# Patient Record
Sex: MALE | Race: WHITE | NOT HISPANIC OR LATINO
[De-identification: names, ages, dates, MRNs, and addresses within clinical notes are randomized per-mention and may not be internally consistent; named-entity substitution may affect disease eponyms.]

---

## 2017-02-02 ENCOUNTER — APPOINTMENT (OUTPATIENT)
Dept: OPHTHALMOLOGY | Facility: CLINIC | Age: 57
End: 2017-02-02

## 2017-02-02 DIAGNOSIS — Z86.39 PERSONAL HISTORY OF OTHER ENDOCRINE, NUTRITIONAL AND METABOLIC DISEASE: ICD-10-CM

## 2017-02-02 DIAGNOSIS — Z86.79 PERSONAL HISTORY OF OTHER DISEASES OF THE CIRCULATORY SYSTEM: ICD-10-CM

## 2017-02-02 RX ORDER — METFORMIN HYDROCHLORIDE 500 MG/1
500 TABLET, COATED ORAL
Refills: 0 | Status: ACTIVE | COMMUNITY

## 2017-02-02 RX ORDER — HYDROCHLOROTHIAZIDE 12.5 MG/1
12.5 TABLET ORAL
Refills: 0 | Status: ACTIVE | COMMUNITY

## 2017-02-02 RX ORDER — AMLODIPINE BESYLATE 10 MG/1
10 TABLET ORAL
Refills: 0 | Status: ACTIVE | COMMUNITY
Start: 2017-02-02

## 2017-02-02 RX ORDER — FLUPHENAZINE HCL 5 MG
5 TABLET ORAL
Refills: 0 | Status: ACTIVE | COMMUNITY

## 2017-02-02 RX ORDER — CLONAZEPAM 1 MG/1
1 TABLET ORAL
Refills: 0 | Status: ACTIVE | COMMUNITY

## 2017-02-02 RX ORDER — ATENOLOL 100 MG/1
100 TABLET ORAL
Refills: 0 | Status: ACTIVE | COMMUNITY

## 2017-02-02 RX ORDER — FENOFIBRATE 145 MG/1
2 TABLET ORAL
Refills: 0 | Status: ACTIVE | COMMUNITY

## 2017-08-23 ENCOUNTER — APPOINTMENT (OUTPATIENT)
Dept: OPHTHALMOLOGY | Facility: CLINIC | Age: 57
End: 2017-08-23
Payer: MEDICARE

## 2017-08-23 PROCEDURE — 92014 COMPRE OPH EXAM EST PT 1/>: CPT

## 2018-08-08 ENCOUNTER — APPOINTMENT (OUTPATIENT)
Dept: OPHTHALMOLOGY | Facility: CLINIC | Age: 58
End: 2018-08-08
Payer: MEDICARE

## 2018-08-08 DIAGNOSIS — H35.373 PUCKERING OF MACULA, BILATERAL: ICD-10-CM

## 2018-08-08 DIAGNOSIS — H26.9 UNSPECIFIED CATARACT: ICD-10-CM

## 2018-08-08 DIAGNOSIS — H25.13 AGE-RELATED NUCLEAR CATARACT, BILATERAL: ICD-10-CM

## 2018-08-08 DIAGNOSIS — H02.89 OTHER SPECIFIED DISORDERS OF EYELID: ICD-10-CM

## 2018-08-08 PROCEDURE — 92134 CPTRZ OPH DX IMG PST SGM RTA: CPT

## 2018-08-08 PROCEDURE — 92014 COMPRE OPH EXAM EST PT 1/>: CPT

## 2019-08-09 ENCOUNTER — NON-APPOINTMENT (OUTPATIENT)
Age: 59
End: 2019-08-09

## 2019-08-09 ENCOUNTER — APPOINTMENT (OUTPATIENT)
Dept: OPHTHALMOLOGY | Facility: CLINIC | Age: 59
End: 2019-08-09
Payer: MEDICARE

## 2019-08-09 PROCEDURE — 92014 COMPRE OPH EXAM EST PT 1/>: CPT

## 2020-06-04 ENCOUNTER — NON-APPOINTMENT (OUTPATIENT)
Age: 60
End: 2020-06-04

## 2020-06-04 ENCOUNTER — APPOINTMENT (OUTPATIENT)
Dept: OPHTHALMOLOGY | Facility: CLINIC | Age: 60
End: 2020-06-04
Payer: MEDICARE

## 2020-06-04 PROCEDURE — 92014 COMPRE OPH EXAM EST PT 1/>: CPT

## 2020-12-21 ENCOUNTER — APPOINTMENT (OUTPATIENT)
Dept: OPHTHALMOLOGY | Facility: CLINIC | Age: 60
End: 2020-12-21
Payer: MEDICARE

## 2020-12-21 ENCOUNTER — NON-APPOINTMENT (OUTPATIENT)
Age: 60
End: 2020-12-21

## 2020-12-21 PROCEDURE — 92012 INTRM OPH EXAM EST PATIENT: CPT

## 2021-06-21 ENCOUNTER — NON-APPOINTMENT (OUTPATIENT)
Age: 61
End: 2021-06-21

## 2021-06-21 ENCOUNTER — APPOINTMENT (OUTPATIENT)
Dept: OPHTHALMOLOGY | Facility: CLINIC | Age: 61
End: 2021-06-21
Payer: MEDICARE

## 2021-06-21 PROCEDURE — 92014 COMPRE OPH EXAM EST PT 1/>: CPT

## 2021-09-15 ENCOUNTER — APPOINTMENT (OUTPATIENT)
Dept: UROLOGY | Facility: CLINIC | Age: 61
End: 2021-09-15

## 2022-08-06 ENCOUNTER — INPATIENT (INPATIENT)
Facility: HOSPITAL | Age: 62
LOS: 3 days | Discharge: NURSING, REHAB OR LONG-TERM CARE | DRG: 393 | End: 2022-08-10
Attending: INTERNAL MEDICINE | Admitting: FAMILY MEDICINE
Payer: MEDICARE

## 2022-08-06 VITALS
RESPIRATION RATE: 18 BRPM | DIASTOLIC BLOOD PRESSURE: 71 MMHG | OXYGEN SATURATION: 99 % | WEIGHT: 154.98 LBS | SYSTOLIC BLOOD PRESSURE: 100 MMHG | TEMPERATURE: 97 F | HEIGHT: 74 IN | HEART RATE: 117 BPM

## 2022-08-06 DIAGNOSIS — Z93.2 ILEOSTOMY STATUS: Chronic | ICD-10-CM

## 2022-08-06 LAB
ALBUMIN SERPL ELPH-MCNC: 1.3 G/DL — LOW (ref 3.3–5)
ALP SERPL-CCNC: 78 U/L — SIGNIFICANT CHANGE UP (ref 40–120)
ALT FLD-CCNC: 69 U/L — HIGH (ref 10–45)
ANION GAP SERPL CALC-SCNC: 10 MMOL/L — SIGNIFICANT CHANGE UP (ref 5–17)
APTT BLD: 18.6 SEC — LOW (ref 27.5–35.5)
AST SERPL-CCNC: 47 U/L — HIGH (ref 10–40)
BASOPHILS # BLD AUTO: 0.08 K/UL — SIGNIFICANT CHANGE UP (ref 0–0.2)
BASOPHILS NFR BLD AUTO: 0.4 % — SIGNIFICANT CHANGE UP (ref 0–2)
BILIRUB SERPL-MCNC: 0.2 MG/DL — SIGNIFICANT CHANGE UP (ref 0.2–1.2)
BUN SERPL-MCNC: 61 MG/DL — HIGH (ref 7–23)
CALCIUM SERPL-MCNC: 7 MG/DL — LOW (ref 8.4–10.5)
CHLORIDE SERPL-SCNC: 98 MMOL/L — SIGNIFICANT CHANGE UP (ref 96–108)
CO2 SERPL-SCNC: 18 MMOL/L — LOW (ref 22–31)
CREAT SERPL-MCNC: 1.32 MG/DL — HIGH (ref 0.5–1.3)
EGFR: 61 ML/MIN/1.73M2 — SIGNIFICANT CHANGE UP
EOSINOPHIL # BLD AUTO: 0 K/UL — SIGNIFICANT CHANGE UP (ref 0–0.5)
EOSINOPHIL NFR BLD AUTO: 0 % — SIGNIFICANT CHANGE UP (ref 0–6)
GLUCOSE SERPL-MCNC: 169 MG/DL — HIGH (ref 70–99)
HCT VFR BLD CALC: 41 % — SIGNIFICANT CHANGE UP (ref 39–50)
HGB BLD-MCNC: 13.7 G/DL — SIGNIFICANT CHANGE UP (ref 13–17)
IMM GRANULOCYTES NFR BLD AUTO: 1.2 % — SIGNIFICANT CHANGE UP (ref 0–1.5)
INR BLD: 0.97 RATIO — SIGNIFICANT CHANGE UP (ref 0.88–1.16)
LACTATE SERPL-SCNC: 5.1 MMOL/L — CRITICAL HIGH (ref 0.7–2)
LIDOCAIN IGE QN: 88 U/L — SIGNIFICANT CHANGE UP (ref 73–393)
LYMPHOCYTES # BLD AUTO: 0.51 K/UL — LOW (ref 1–3.3)
LYMPHOCYTES # BLD AUTO: 2.8 % — LOW (ref 13–44)
MCHC RBC-ENTMCNC: 29.5 PG — SIGNIFICANT CHANGE UP (ref 27–34)
MCHC RBC-ENTMCNC: 33.4 GM/DL — SIGNIFICANT CHANGE UP (ref 32–36)
MCV RBC AUTO: 88.2 FL — SIGNIFICANT CHANGE UP (ref 80–100)
MONOCYTES # BLD AUTO: 2.05 K/UL — HIGH (ref 0–0.9)
MONOCYTES NFR BLD AUTO: 11.3 % — SIGNIFICANT CHANGE UP (ref 2–14)
NEUTROPHILS # BLD AUTO: 15.24 K/UL — HIGH (ref 1.8–7.4)
NEUTROPHILS NFR BLD AUTO: 84.3 % — HIGH (ref 43–77)
NRBC # BLD: 0 /100 WBCS — SIGNIFICANT CHANGE UP (ref 0–0)
PLATELET # BLD AUTO: 512 K/UL — HIGH (ref 150–400)
POTASSIUM SERPL-MCNC: 6.1 MMOL/L — HIGH (ref 3.5–5.3)
POTASSIUM SERPL-SCNC: 6.1 MMOL/L — HIGH (ref 3.5–5.3)
PROT SERPL-MCNC: 4.1 G/DL — LOW (ref 6–8.3)
PROTHROM AB SERPL-ACNC: 11.2 SEC — SIGNIFICANT CHANGE UP (ref 10.5–13.4)
RBC # BLD: 4.65 M/UL — SIGNIFICANT CHANGE UP (ref 4.2–5.8)
RBC # FLD: 16.3 % — HIGH (ref 10.3–14.5)
SARS-COV-2 RNA SPEC QL NAA+PROBE: SIGNIFICANT CHANGE UP
SODIUM SERPL-SCNC: 126 MMOL/L — LOW (ref 135–145)
WBC # BLD: 18.1 K/UL — HIGH (ref 3.8–10.5)
WBC # FLD AUTO: 18.1 K/UL — HIGH (ref 3.8–10.5)

## 2022-08-06 PROCEDURE — 71045 X-RAY EXAM CHEST 1 VIEW: CPT | Mod: 26,76

## 2022-08-06 PROCEDURE — 43753 TX GASTRO INTUB W/ASP: CPT | Mod: 59

## 2022-08-06 PROCEDURE — 93010 ELECTROCARDIOGRAM REPORT: CPT

## 2022-08-06 PROCEDURE — 74177 CT ABD & PELVIS W/CONTRAST: CPT | Mod: 26,MA

## 2022-08-06 PROCEDURE — 99291 CRITICAL CARE FIRST HOUR: CPT | Mod: FT,25

## 2022-08-06 RX ORDER — CALCIUM GLUCONATE 100 MG/ML
1 VIAL (ML) INTRAVENOUS ONCE
Refills: 0 | Status: COMPLETED | OUTPATIENT
Start: 2022-08-06 | End: 2022-08-06

## 2022-08-06 RX ORDER — INSULIN HUMAN 100 [IU]/ML
10 INJECTION, SOLUTION SUBCUTANEOUS ONCE
Refills: 0 | Status: COMPLETED | OUTPATIENT
Start: 2022-08-06 | End: 2022-08-06

## 2022-08-06 RX ORDER — MORPHINE SULFATE 50 MG/1
4 CAPSULE, EXTENDED RELEASE ORAL ONCE
Refills: 0 | Status: DISCONTINUED | OUTPATIENT
Start: 2022-08-06 | End: 2022-08-06

## 2022-08-06 RX ORDER — VANCOMYCIN HCL 1 G
1000 VIAL (EA) INTRAVENOUS ONCE
Refills: 0 | Status: COMPLETED | OUTPATIENT
Start: 2022-08-06 | End: 2022-08-06

## 2022-08-06 RX ORDER — PIPERACILLIN AND TAZOBACTAM 4; .5 G/20ML; G/20ML
3.38 INJECTION, POWDER, LYOPHILIZED, FOR SOLUTION INTRAVENOUS ONCE
Refills: 0 | Status: COMPLETED | OUTPATIENT
Start: 2022-08-06 | End: 2022-08-06

## 2022-08-06 RX ORDER — ONDANSETRON 8 MG/1
4 TABLET, FILM COATED ORAL ONCE
Refills: 0 | Status: COMPLETED | OUTPATIENT
Start: 2022-08-06 | End: 2022-08-06

## 2022-08-06 RX ORDER — SODIUM CHLORIDE 9 MG/ML
1000 INJECTION INTRAMUSCULAR; INTRAVENOUS; SUBCUTANEOUS ONCE
Refills: 0 | Status: COMPLETED | OUTPATIENT
Start: 2022-08-06 | End: 2022-08-06

## 2022-08-06 RX ORDER — FUROSEMIDE 40 MG
40 TABLET ORAL ONCE
Refills: 0 | Status: COMPLETED | OUTPATIENT
Start: 2022-08-06 | End: 2022-08-06

## 2022-08-06 RX ORDER — DEXTROSE 50 % IN WATER 50 %
50 SYRINGE (ML) INTRAVENOUS ONCE
Refills: 0 | Status: COMPLETED | OUTPATIENT
Start: 2022-08-06 | End: 2022-08-06

## 2022-08-06 RX ADMIN — PIPERACILLIN AND TAZOBACTAM 200 GRAM(S): 4; .5 INJECTION, POWDER, LYOPHILIZED, FOR SOLUTION INTRAVENOUS at 21:38

## 2022-08-06 RX ADMIN — Medication 250 MILLIGRAM(S): at 22:05

## 2022-08-06 RX ADMIN — PIPERACILLIN AND TAZOBACTAM 3.38 GRAM(S): 4; .5 INJECTION, POWDER, LYOPHILIZED, FOR SOLUTION INTRAVENOUS at 22:03

## 2022-08-06 RX ADMIN — Medication 1000 MILLIGRAM(S): at 23:18

## 2022-08-06 RX ADMIN — MORPHINE SULFATE 4 MILLIGRAM(S): 50 CAPSULE, EXTENDED RELEASE ORAL at 19:53

## 2022-08-06 RX ADMIN — SODIUM CHLORIDE 1000 MILLILITER(S): 9 INJECTION INTRAMUSCULAR; INTRAVENOUS; SUBCUTANEOUS at 20:38

## 2022-08-06 RX ADMIN — SODIUM CHLORIDE 1000 MILLILITER(S): 9 INJECTION INTRAMUSCULAR; INTRAVENOUS; SUBCUTANEOUS at 19:38

## 2022-08-06 RX ADMIN — MORPHINE SULFATE 4 MILLIGRAM(S): 50 CAPSULE, EXTENDED RELEASE ORAL at 19:38

## 2022-08-06 RX ADMIN — ONDANSETRON 4 MILLIGRAM(S): 8 TABLET, FILM COATED ORAL at 19:38

## 2022-08-06 NOTE — CONSULT NOTE ADULT - ASSESSMENT
Impression/Plan:  CNITHYA GUZMAN is a 63yo man with .     --------------------------------------------------------    HPI:  CINTHYA GUZMAN is a 63yo man who presented with .     Medical Hx:    Surgical Hx:    Medications:  atorvastatin 20 mg oral tablet: 1 tab(s) orally once a day  benztropine 2 mg oral tablet:   busPIRone 5 mg oral tablet: 1 tab(s) orally 2 times a day  calcitriol 0.25 mcg oral capsule: 1 cap(s) orally once a day  digoxin 125 mcg (0.125 mg) oral tablet: 1 tab(s) orally once a day  Eliquis 2.5 mg oral tablet: 1 tab(s) orally 2 times a day  fludrocortisone 0.1 mg oral tablet:   fluPHENAZine hydrochloride:   Levoxyl 50 mcg (0.05 mg) oral tablet: 1 tab(s) orally once a day  midodrine 5 mg oral tablet:   Pancreaze:   predniSONE 20 mg oral tablet: 1 tab(s) orally once a day    Allergies:  No Known Allergies    Social Hx:  No tobacco use, excessive ETOH use, or illicit drug use.     Family Hx:    ROS:  Except as noted in the HPI or above, all other systems negative.     Objective:   T(C): 36.9, Max: 36.9 (08-06-22 @ 20:30)  HR: 115 (105 - 117)  BP: 94/67 (84/49 - 107/66)  RR: 20 (18 - 20)  SpO2: 100% (99% - 100%)    Physical Exam  Constitutional: No distress  Eyes: EOMI; PERRL; no drainage or redness  ENMT: No gross abnormalities  Neck: No nodules.   Breasts: Not examined.  Back: No deformities.  Respiratory: Breath Sounds equal & clear to percussion & auscultation, no accessory muscle use.  Cardiovascular: RRR.  Gastrointestinal: Soft, non-distended, non-tender. No signs of diffuse peritonitis.   Genitourinary: Not examined.  Rectal: Not examined.  Extremities: No cyanosis, clubbing or edema.  Vascular: Palpable distal pulses.   Neurological: Alert and oriented. No focal deficits.   Skin: No pertinent lesions or rashes.   Lymph Nodes: No lymphadedenopathy  Musculoskeletal: No joint pain, swelling or deformity; no limitation of movement  Psychiatric: Normal affect, verbalizations, behavior.     Laboratory Data    Imaging Data Impression/Plan:  CINTHYA GUZMAN is a 63yo man with SBO caused by an incarcerated right inguinal hernia.    #SBO 2/to RIGHT INGUINAL HERNIA  - Hernia reduced at bedside.   - Serial abdominal examinations.   - Continue NGT and await for ileostomy function.   - Recommended to patient that he consider inguinal hernia repair prior to hospital discharge.   - Would appreciate medical optimization in anticipation of surgery.      --------------------------------------------------------    HPI:  CINTHYA GUZMAN is a 63yo man with a history pertinent for right colectomy and ileostomy formation who presented with abdominal pain and lack of ileostomy output. He does report having a right inguinal hernia since around October or November. The bulge typically appears when sitting in his wheelchair and self-reduces. Over the past day, however, he noted increasing pain in the right abdomen with increasing abdominal distension. Numerous episodes of vomiting. Workup was concerning for SBO and an irreducible right inguinal hernia. NGT was placed with feculent output. No fevers, chills, SOB, chest pain, or other concerning symptoms.     Medical Hx:  Schizophrenia  HTN  Afib  Chronic hyponatremia  Hypotension on midodrine.   Pancreatic insufficiency  HLD  DM2     Surgical Hx:  Right colon resection and ileostomy (for "water passing through" per patient. Records unavailable.   Lap cholecystectomy    Home Medications:  atorvastatin 20 mg oral tablet: 1 tab(s) orally once a day  benztropine 2 mg oral tablet:   busPIRone 5 mg oral tablet: 1 tab(s) orally 2 times a day  calcitriol 0.25 mcg oral capsule: 1 cap(s) orally once a day  digoxin 125 mcg (0.125 mg) oral tablet: 1 tab(s) orally once a day  Eliquis 2.5 mg oral tablet: 1 tab(s) orally 2 times a day  fludrocortisone 0.1 mg oral tablet:   fluPHENAZine hydrochloride:   Levoxyl 50 mcg (0.05 mg) oral tablet: 1 tab(s) orally once a day  midodrine 5 mg oral tablet:   Pancreaze:   predniSONE 20 mg oral tablet: 1 tab(s) orally once a day    Allergies:  No Known Allergies    Social Hx:  No tobacco use, excessive ETOH use, or illicit drug use.     Family Hx:    ROS:  Except as noted in the HPI or above, all other systems negative.     Objective:   T(C): 36.9, Max: 36.9 (08-06-22 @ 20:30)  HR: 115 (105 - 117)  BP: 94/67 (84/49 - 107/66)  RR: 20 (18 - 20)  SpO2: 100% (99% - 100%)    Physical Exam  Constitutional: No distress  Eyes: EOMI; PERRL; no drainage or redness  ENMT: NGT with feculent output.   Neck: No nodules.   Breasts: Not examined.  Back: No deformities.  Respiratory: Breath Sounds equal & clear to percussion & auscultation, no accessory muscle use.  Cardiovascular: RRR.  Gastrointestinal: Distended with tympany.   Genitourinary: Right inguinal hernia incarcerated and reduced. No overlying skin erythema or ecchymosis. No left inguinal hernia.   Rectal: Not examined.  Extremities: No cyanosis, clubbing or edema.  Vascular: Warm, well-perfused.   Neurological: Alert and oriented. No focal deficits.   Skin: No pertinent lesions or rashes.   Lymph Nodes: No lymphadedenopathy  Musculoskeletal: No joint pain, swelling or deformity; no limitation of movement  Psychiatric: Normal affect, verbalizations, behavior.     Laboratory Data            x                   126   | 98   | 61     x     )------( x         --------------------<169                x                   6.1   | 18   | 1.32     TP    x    | ALB 1.3       CA 7.0           PT x        ----------------------  AST 47   | ALT 69        MAG x          PTT x                ----------------------  TBIL 0.2  | DBIL x         PHOS x         INR x       ----------------------  ALP 78       Imaging Data  CT ABD/PEL 8/6/22:  SBO with transition at right inguinal hernia.  Impression/Plan:  CINTHYA GUZMAN is a 61yo man with SBO caused by an incarcerated right inguinal hernia.    #SBO 2/to RIGHT INGUINAL HERNIA  - Hernia reduced at bedside.   - Serial abdominal examinations.   - Continue NGT and await for ileostomy function.   - Recommended to patient that he consider inguinal hernia repair prior to hospital discharge.   - Would appreciate medical optimization in anticipation of surgery.      --------------------------------------------------------    HPI:  CINTHYA GUZMAN is a 61yo man with a history pertinent for total colectomy and ileostomy formation who presented with abdominal pain and lack of ileostomy output. He does report having a right inguinal hernia since around October or November. The bulge typically appears when sitting in his wheelchair and self-reduces. Over the past day, however, he noted increasing pain in the right abdomen with increasing abdominal distension. Numerous episodes of vomiting. Workup was concerning for SBO and an irreducible right inguinal hernia. NGT was placed with feculent output. No fevers, chills, SOB, chest pain, or other concerning symptoms.     Medical Hx:  Schizophrenia  HTN  Afib  Chronic hyponatremia  Hypotension on midodrine.   Pancreatic insufficiency  HLD  DM2     Surgical Hx:  Total colectomy and ileostomy (possibly for IBD? records unavailable)  Lap cholecystectomy    Home Medications:  atorvastatin 20 mg oral tablet: 1 tab(s) orally once a day  benztropine 2 mg oral tablet:   busPIRone 5 mg oral tablet: 1 tab(s) orally 2 times a day  calcitriol 0.25 mcg oral capsule: 1 cap(s) orally once a day  digoxin 125 mcg (0.125 mg) oral tablet: 1 tab(s) orally once a day  Eliquis 2.5 mg oral tablet: 1 tab(s) orally 2 times a day  fludrocortisone 0.1 mg oral tablet:   fluPHENAZine hydrochloride:   Levoxyl 50 mcg (0.05 mg) oral tablet: 1 tab(s) orally once a day  midodrine 5 mg oral tablet:   Pancreaze:   predniSONE 20 mg oral tablet: 1 tab(s) orally once a day    Allergies:  No Known Allergies    Social Hx:  No tobacco use, excessive ETOH use, or illicit drug use.     Family Hx:    ROS:  Except as noted in the HPI or above, all other systems negative.     Objective:   T(C): 36.9, Max: 36.9 (08-06-22 @ 20:30)  HR: 115 (105 - 117)  BP: 94/67 (84/49 - 107/66)  RR: 20 (18 - 20)  SpO2: 100% (99% - 100%)    Physical Exam  Constitutional: No distress  Eyes: EOMI; PERRL; no drainage or redness  ENMT: NGT with feculent output.   Neck: No nodules.   Breasts: Not examined.  Back: No deformities.  Respiratory: Breath Sounds equal & clear to percussion & auscultation, no accessory muscle use.  Cardiovascular: RRR.  Gastrointestinal: Distended with tympany.   Genitourinary: Right inguinal hernia incarcerated and reduced. No overlying skin erythema or ecchymosis. No left inguinal hernia.   Rectal: Not examined.  Extremities: No cyanosis, clubbing or edema.  Vascular: Warm, well-perfused.   Neurological: Alert and oriented. No focal deficits.   Skin: No pertinent lesions or rashes.   Lymph Nodes: No lymphadedenopathy  Musculoskeletal: No joint pain, swelling or deformity; no limitation of movement  Psychiatric: Normal affect, verbalizations, behavior.     Laboratory Data            x                   126   | 98   | 61     x     )------( x         --------------------<169                x                   6.1   | 18   | 1.32     TP    x    | ALB 1.3       CA 7.0           PT x        ----------------------  AST 47   | ALT 69        MAG x          PTT x                ----------------------  TBIL 0.2  | DBIL x         PHOS x         INR x       ----------------------  ALP 78       Imaging Data  CT ABD/PEL 8/6/22:  SBO with transition at right inguinal hernia.  Impression/Plan:  CINTHYA GUZMAN is a 61yo man with SBO caused by an incarcerated right inguinal hernia.    #SBO 2/to RIGHT INGUINAL HERNIA  - Hernia reduced at bedside.   - Serial abdominal examinations.   - Continue NGT and await for ileostomy function.   - Recommended to patient that he consider inguinal hernia repair in the near future.   - May need to wait until excoriations on right groin from ileostomy output irritation heals.   - Would appreciate medical optimization in anticipation of surgery.      --------------------------------------------------------    HPI:  CINTHYA GUZMAN is a 61yo man with a history pertinent for total colectomy and ileostomy formation who presented with abdominal pain and lack of ileostomy output. He does report having a right inguinal hernia since around October or November. The bulge typically appears when sitting in his wheelchair and self-reduces. Over the past day, however, he noted increasing pain in the right abdomen with increasing abdominal distension. Numerous episodes of vomiting. Workup was concerning for SBO and an irreducible right inguinal hernia. NGT was placed with feculent output. No fevers, chills, SOB, chest pain, or other concerning symptoms.     Medical Hx:  Schizophrenia  HTN  Afib  Chronic hyponatremia  Hypotension on midodrine.   Pancreatic insufficiency  HLD  DM2     Surgical Hx:  Total colectomy and ileostomy (possibly for IBD? records unavailable)  Lap cholecystectomy    Home Medications:  atorvastatin 20 mg oral tablet: 1 tab(s) orally once a day  benztropine 2 mg oral tablet:   busPIRone 5 mg oral tablet: 1 tab(s) orally 2 times a day  calcitriol 0.25 mcg oral capsule: 1 cap(s) orally once a day  digoxin 125 mcg (0.125 mg) oral tablet: 1 tab(s) orally once a day  Eliquis 2.5 mg oral tablet: 1 tab(s) orally 2 times a day  fludrocortisone 0.1 mg oral tablet:   fluPHENAZine hydrochloride:   Levoxyl 50 mcg (0.05 mg) oral tablet: 1 tab(s) orally once a day  midodrine 5 mg oral tablet:   Pancreaze:   predniSONE 20 mg oral tablet: 1 tab(s) orally once a day    Allergies:  No Known Allergies    Social Hx:  No tobacco use, excessive ETOH use, or illicit drug use.     Family Hx:    ROS:  Except as noted in the HPI or above, all other systems negative.     Objective:   T(C): 36.9, Max: 36.9 (08-06-22 @ 20:30)  HR: 115 (105 - 117)  BP: 94/67 (84/49 - 107/66)  RR: 20 (18 - 20)  SpO2: 100% (99% - 100%)    Physical Exam  Constitutional: No distress  Eyes: EOMI; PERRL; no drainage or redness  ENMT: NGT with feculent output.   Neck: No nodules.   Breasts: Not examined.  Back: No deformities.  Respiratory: Breath Sounds equal & clear to percussion & auscultation, no accessory muscle use.  Cardiovascular: RRR.  Gastrointestinal: Distended with tympany.   Genitourinary: Right inguinal hernia incarcerated and reduced. No overlying skin erythema or ecchymosis. Excoriations from ileostomy output. No left inguinal hernia.   Rectal: Not examined.  Extremities: No cyanosis, clubbing or edema.  Vascular: Warm, well-perfused.   Neurological: Alert and oriented. No focal deficits.   Skin: No pertinent lesions or rashes.   Lymph Nodes: No lymphadedenopathy  Musculoskeletal: No joint pain, swelling or deformity; no limitation of movement  Psychiatric: Normal affect, verbalizations, behavior.     Laboratory Data            x                   126   | 98   | 61     x     )------( x         --------------------<169                x                   6.1   | 18   | 1.32     TP    x    | ALB 1.3       CA 7.0           PT x        ----------------------  AST 47   | ALT 69        MAG x          PTT x                ----------------------  TBIL 0.2  | DBIL x         PHOS x         INR x       ----------------------  ALP 78       Imaging Data  CT ABD/PEL 8/6/22:  SBO with transition at right inguinal hernia.

## 2022-08-06 NOTE — ED ADULT TRIAGE NOTE - CHIEF COMPLAINT QUOTE
BIB EMS from [facility] for c/o abd pain with n/v since yesterday. Per EMS, staff report they think there is an issue with his ileostomy has had minimal drainage since yesterday,

## 2022-08-06 NOTE — ED ADULT NURSE NOTE - NSICDXPASTMEDICALHX_GEN_ALL_CORE_FT
PAST MEDICAL HISTORY:  Atrial fibrillation     Generalized anxiety disorder     Heart failure     History of hypotension     Type 2 diabetes mellitus

## 2022-08-07 DIAGNOSIS — K56.609 UNSPECIFIED INTESTINAL OBSTRUCTION, UNSPECIFIED AS TO PARTIAL VERSUS COMPLETE OBSTRUCTION: ICD-10-CM

## 2022-08-07 DIAGNOSIS — Z90.49 ACQUIRED ABSENCE OF OTHER SPECIFIED PARTS OF DIGESTIVE TRACT: Chronic | ICD-10-CM

## 2022-08-07 LAB
ANION GAP SERPL CALC-SCNC: 10 MMOL/L — SIGNIFICANT CHANGE UP (ref 5–17)
APPEARANCE UR: CLEAR — SIGNIFICANT CHANGE UP
BILIRUB UR-MCNC: NEGATIVE — SIGNIFICANT CHANGE UP
BUN SERPL-MCNC: 63 MG/DL — HIGH (ref 7–23)
CALCIUM SERPL-MCNC: 7 MG/DL — LOW (ref 8.4–10.5)
CHLORIDE SERPL-SCNC: 100 MMOL/L — SIGNIFICANT CHANGE UP (ref 96–108)
CO2 SERPL-SCNC: 17 MMOL/L — LOW (ref 22–31)
COLOR SPEC: YELLOW — SIGNIFICANT CHANGE UP
CREAT SERPL-MCNC: 1.45 MG/DL — HIGH (ref 0.5–1.3)
DIFF PNL FLD: NEGATIVE — SIGNIFICANT CHANGE UP
EGFR: 54 ML/MIN/1.73M2 — LOW
GLUCOSE BLDC GLUCOMTR-MCNC: 151 MG/DL — HIGH (ref 70–99)
GLUCOSE SERPL-MCNC: 129 MG/DL — HIGH (ref 70–99)
GLUCOSE UR QL: NEGATIVE — SIGNIFICANT CHANGE UP
KETONES UR-MCNC: NEGATIVE — SIGNIFICANT CHANGE UP
LACTATE SERPL-SCNC: 2.4 MMOL/L — HIGH (ref 0.7–2)
LACTATE SERPL-SCNC: 4.2 MMOL/L — CRITICAL HIGH (ref 0.7–2)
LEUKOCYTE ESTERASE UR-ACNC: NEGATIVE — SIGNIFICANT CHANGE UP
NITRITE UR-MCNC: NEGATIVE — SIGNIFICANT CHANGE UP
PH UR: 5 — SIGNIFICANT CHANGE UP (ref 5–8)
PHOSPHATE SERPL-MCNC: 5.7 MG/DL — HIGH (ref 2.5–4.5)
POTASSIUM SERPL-MCNC: 5.5 MMOL/L — HIGH (ref 3.5–5.3)
POTASSIUM SERPL-SCNC: 5.5 MMOL/L — HIGH (ref 3.5–5.3)
PROT UR-MCNC: NEGATIVE — SIGNIFICANT CHANGE UP
SODIUM SERPL-SCNC: 127 MMOL/L — LOW (ref 135–145)
SP GR SPEC: 1.01 — SIGNIFICANT CHANGE UP (ref 1.01–1.02)
UROBILINOGEN FLD QL: NEGATIVE — SIGNIFICANT CHANGE UP

## 2022-08-07 PROCEDURE — 99497 ADVNCD CARE PLAN 30 MIN: CPT | Mod: 25

## 2022-08-07 PROCEDURE — 99291 CRITICAL CARE FIRST HOUR: CPT

## 2022-08-07 PROCEDURE — 99292 CRITICAL CARE ADDL 30 MIN: CPT

## 2022-08-07 PROCEDURE — 99231 SBSQ HOSP IP/OBS SF/LOW 25: CPT

## 2022-08-07 PROCEDURE — 93010 ELECTROCARDIOGRAM REPORT: CPT

## 2022-08-07 RX ORDER — HYDROCORTISONE 20 MG
500 TABLET ORAL ONCE
Refills: 0 | Status: COMPLETED | OUTPATIENT
Start: 2022-08-07 | End: 2022-08-07

## 2022-08-07 RX ORDER — SODIUM CHLORIDE 9 MG/ML
1000 INJECTION, SOLUTION INTRAVENOUS
Refills: 0 | Status: DISCONTINUED | OUTPATIENT
Start: 2022-08-07 | End: 2022-08-07

## 2022-08-07 RX ORDER — LANOLIN ALCOHOL/MO/W.PET/CERES
3 CREAM (GRAM) TOPICAL AT BEDTIME
Refills: 0 | Status: DISCONTINUED | OUTPATIENT
Start: 2022-08-07 | End: 2022-08-10

## 2022-08-07 RX ORDER — SODIUM CHLORIDE 9 MG/ML
1000 INJECTION, SOLUTION INTRAVENOUS
Refills: 0 | Status: DISCONTINUED | OUTPATIENT
Start: 2022-08-07 | End: 2022-08-08

## 2022-08-07 RX ORDER — ALBUMIN HUMAN 25 %
100 VIAL (ML) INTRAVENOUS
Refills: 0 | Status: COMPLETED | OUTPATIENT
Start: 2022-08-07 | End: 2022-08-07

## 2022-08-07 RX ORDER — LIPASE/PROTEASE/AMYLASE 16-48-48K
2 CAPSULE,DELAYED RELEASE (ENTERIC COATED) ORAL
Refills: 0 | Status: DISCONTINUED | OUTPATIENT
Start: 2022-08-07 | End: 2022-08-10

## 2022-08-07 RX ORDER — ACETAMINOPHEN 500 MG
650 TABLET ORAL EVERY 6 HOURS
Refills: 0 | Status: DISCONTINUED | OUTPATIENT
Start: 2022-08-07 | End: 2022-08-10

## 2022-08-07 RX ORDER — BENZTROPINE MESYLATE 1 MG
2 TABLET ORAL DAILY
Refills: 0 | Status: DISCONTINUED | OUTPATIENT
Start: 2022-08-07 | End: 2022-08-08

## 2022-08-07 RX ORDER — LEVOTHYROXINE SODIUM 125 MCG
25 TABLET ORAL AT BEDTIME
Refills: 0 | Status: DISCONTINUED | OUTPATIENT
Start: 2022-08-07 | End: 2022-08-08

## 2022-08-07 RX ORDER — SODIUM CHLORIDE 9 MG/ML
500 INJECTION, SOLUTION INTRAVENOUS ONCE
Refills: 0 | Status: COMPLETED | OUTPATIENT
Start: 2022-08-07 | End: 2022-08-07

## 2022-08-07 RX ORDER — HYDROCORTISONE 20 MG
100 TABLET ORAL EVERY 8 HOURS
Refills: 0 | Status: DISCONTINUED | OUTPATIENT
Start: 2022-08-07 | End: 2022-08-09

## 2022-08-07 RX ORDER — FLUPHENAZINE HYDROCHLORIDE 1 MG/1
2.5 TABLET, FILM COATED ORAL
Refills: 0 | Status: DISCONTINUED | OUTPATIENT
Start: 2022-08-07 | End: 2022-08-08

## 2022-08-07 RX ORDER — PANTOPRAZOLE SODIUM 20 MG/1
40 TABLET, DELAYED RELEASE ORAL
Refills: 0 | Status: DISCONTINUED | OUTPATIENT
Start: 2022-08-07 | End: 2022-08-08

## 2022-08-07 RX ORDER — LIPASE/PROTEASE/AMYLASE 16-48-48K
1 CAPSULE,DELAYED RELEASE (ENTERIC COATED) ORAL
Refills: 0 | Status: DISCONTINUED | OUTPATIENT
Start: 2022-08-07 | End: 2022-08-07

## 2022-08-07 RX ORDER — ONDANSETRON 8 MG/1
4 TABLET, FILM COATED ORAL EVERY 8 HOURS
Refills: 0 | Status: DISCONTINUED | OUTPATIENT
Start: 2022-08-07 | End: 2022-08-10

## 2022-08-07 RX ORDER — MIDODRINE HYDROCHLORIDE 2.5 MG/1
5 TABLET ORAL THREE TIMES A DAY
Refills: 0 | Status: DISCONTINUED | OUTPATIENT
Start: 2022-08-07 | End: 2022-08-07

## 2022-08-07 RX ORDER — PIPERACILLIN AND TAZOBACTAM 4; .5 G/20ML; G/20ML
3.38 INJECTION, POWDER, LYOPHILIZED, FOR SOLUTION INTRAVENOUS EVERY 8 HOURS
Refills: 0 | Status: DISCONTINUED | OUTPATIENT
Start: 2022-08-07 | End: 2022-08-08

## 2022-08-07 RX ORDER — FLUDROCORTISONE ACETATE 0.1 MG/1
0.1 TABLET ORAL DAILY
Refills: 0 | Status: DISCONTINUED | OUTPATIENT
Start: 2022-08-07 | End: 2022-08-10

## 2022-08-07 RX ORDER — CALCITRIOL 0.5 UG/1
0.25 CAPSULE ORAL DAILY
Refills: 0 | Status: DISCONTINUED | OUTPATIENT
Start: 2022-08-07 | End: 2022-08-10

## 2022-08-07 RX ORDER — MIDODRINE HYDROCHLORIDE 2.5 MG/1
10 TABLET ORAL THREE TIMES A DAY
Refills: 0 | Status: DISCONTINUED | OUTPATIENT
Start: 2022-08-07 | End: 2022-08-10

## 2022-08-07 RX ORDER — SODIUM CHLORIDE 9 MG/ML
2000 INJECTION INTRAMUSCULAR; INTRAVENOUS; SUBCUTANEOUS ONCE
Refills: 0 | Status: COMPLETED | OUTPATIENT
Start: 2022-08-07 | End: 2022-08-07

## 2022-08-07 RX ORDER — HYDROCORTISONE 20 MG
100 TABLET ORAL EVERY 8 HOURS
Refills: 0 | Status: DISCONTINUED | OUTPATIENT
Start: 2022-08-07 | End: 2022-08-07

## 2022-08-07 RX ORDER — DIGOXIN 250 MCG
125 TABLET ORAL DAILY
Refills: 0 | Status: DISCONTINUED | OUTPATIENT
Start: 2022-08-07 | End: 2022-08-08

## 2022-08-07 RX ADMIN — SODIUM CHLORIDE 1000 MILLILITER(S): 9 INJECTION INTRAMUSCULAR; INTRAVENOUS; SUBCUTANEOUS at 00:13

## 2022-08-07 RX ADMIN — Medication 100 MILLIGRAM(S): at 17:21

## 2022-08-07 RX ADMIN — Medication 100 MILLIGRAM(S): at 21:35

## 2022-08-07 RX ADMIN — PANTOPRAZOLE SODIUM 40 MILLIGRAM(S): 20 TABLET, DELAYED RELEASE ORAL at 05:25

## 2022-08-07 RX ADMIN — Medication 25 MICROGRAM(S): at 21:36

## 2022-08-07 RX ADMIN — Medication 40 MILLIGRAM(S): at 00:01

## 2022-08-07 RX ADMIN — Medication 5 MILLIGRAM(S): at 05:26

## 2022-08-07 RX ADMIN — MIDODRINE HYDROCHLORIDE 10 MILLIGRAM(S): 2.5 TABLET ORAL at 11:12

## 2022-08-07 RX ADMIN — Medication 100 MILLILITER(S): at 08:48

## 2022-08-07 RX ADMIN — FLUPHENAZINE HYDROCHLORIDE 2.5 MILLIGRAM(S): 1 TABLET, FILM COATED ORAL at 05:30

## 2022-08-07 RX ADMIN — FLUDROCORTISONE ACETATE 0.1 MILLIGRAM(S): 0.1 TABLET ORAL at 05:26

## 2022-08-07 RX ADMIN — Medication 100 MILLIGRAM(S): at 05:25

## 2022-08-07 RX ADMIN — Medication 100 MILLIGRAM(S): at 03:49

## 2022-08-07 RX ADMIN — INSULIN HUMAN 10 UNIT(S): 100 INJECTION, SOLUTION SUBCUTANEOUS at 00:11

## 2022-08-07 RX ADMIN — CALCITRIOL 0.25 MICROGRAM(S): 0.5 CAPSULE ORAL at 10:21

## 2022-08-07 RX ADMIN — Medication 2 MILLIGRAM(S): at 12:37

## 2022-08-07 RX ADMIN — MIDODRINE HYDROCHLORIDE 10 MILLIGRAM(S): 2.5 TABLET ORAL at 17:21

## 2022-08-07 RX ADMIN — Medication 125 MICROGRAM(S): at 12:14

## 2022-08-07 RX ADMIN — SODIUM CHLORIDE 2000 MILLILITER(S): 9 INJECTION INTRAMUSCULAR; INTRAVENOUS; SUBCUTANEOUS at 01:18

## 2022-08-07 RX ADMIN — FLUPHENAZINE HYDROCHLORIDE 2.5 MILLIGRAM(S): 1 TABLET, FILM COATED ORAL at 17:32

## 2022-08-07 RX ADMIN — PIPERACILLIN AND TAZOBACTAM 25 GRAM(S): 4; .5 INJECTION, POWDER, LYOPHILIZED, FOR SOLUTION INTRAVENOUS at 09:51

## 2022-08-07 RX ADMIN — Medication 2 CAPSULE(S): at 08:00

## 2022-08-07 RX ADMIN — SODIUM CHLORIDE 1000 MILLILITER(S): 9 INJECTION, SOLUTION INTRAVENOUS at 15:02

## 2022-08-07 RX ADMIN — Medication 100 GRAM(S): at 00:18

## 2022-08-07 RX ADMIN — Medication 2 CAPSULE(S): at 17:22

## 2022-08-07 RX ADMIN — Medication 2 CAPSULE(S): at 12:37

## 2022-08-07 RX ADMIN — PIPERACILLIN AND TAZOBACTAM 25 GRAM(S): 4; .5 INJECTION, POWDER, LYOPHILIZED, FOR SOLUTION INTRAVENOUS at 21:35

## 2022-08-07 RX ADMIN — SODIUM CHLORIDE 125 MILLILITER(S): 9 INJECTION, SOLUTION INTRAVENOUS at 15:04

## 2022-08-07 RX ADMIN — Medication 100 MILLILITER(S): at 05:34

## 2022-08-07 RX ADMIN — PIPERACILLIN AND TAZOBACTAM 25 GRAM(S): 4; .5 INJECTION, POWDER, LYOPHILIZED, FOR SOLUTION INTRAVENOUS at 17:20

## 2022-08-07 RX ADMIN — PANTOPRAZOLE SODIUM 40 MILLIGRAM(S): 20 TABLET, DELAYED RELEASE ORAL at 01:18

## 2022-08-07 RX ADMIN — PANTOPRAZOLE SODIUM 40 MILLIGRAM(S): 20 TABLET, DELAYED RELEASE ORAL at 17:23

## 2022-08-07 RX ADMIN — Medication 50 MILLILITER(S): at 00:01

## 2022-08-07 RX ADMIN — MIDODRINE HYDROCHLORIDE 10 MILLIGRAM(S): 2.5 TABLET ORAL at 05:26

## 2022-08-07 RX ADMIN — Medication 5 MILLIGRAM(S): at 17:23

## 2022-08-07 NOTE — PATIENT PROFILE ADULT - NSTRANSFERBELONGINGSDISPO_GEN_A_NUR
Parents completed DEC assessment. Patient is asking for privacy so parents escorted out to ED lobby.   with patient

## 2022-08-07 NOTE — H&P ADULT - NSHPPHYSICALEXAM_GEN_ALL_CORE
Vital Signs Last 24 Hrs  T(C): 36.9 (06 Aug 2022 20:30), Max: 36.9 (06 Aug 2022 20:30)  T(F): 98.5 (06 Aug 2022 20:30), Max: 98.5 (06 Aug 2022 20:30)  HR: 115 (06 Aug 2022 23:18) (105 - 117)  BP: 94/67 (06 Aug 2022 23:18) (84/49 - 107/66)  BP(mean): --  RR: 20 (06 Aug 2022 23:18) (18 - 20)  SpO2: 100% (06 Aug 2022 23:18) (99% - 100%)    Parameters below as of 06 Aug 2022 23:18  Patient On (Oxygen Delivery Method): room air        GENERAL- NAD  EAR/NOSE/MOUTH/THROAT - no pharyngeal exudates, no oral lesions,  MMM  EYES- LILIBETH, conjunctiva and Sclera clear  NECK- supple  RESPIRATORY-  clear to auscultation bilaterally, non laboured breathing  CARDIOVASCULAR - SIS2, RRR  GI - soft, generalized tenderness, ostomy+,  BS present  EXTREMITIES- no pedal edema  NEUROLOGY- no gross focal deficits  SKIN- no rashes, warm to touch  PSYCHIATRY- AAO X 3  MUSCULOSKELETAL- ROM normal

## 2022-08-07 NOTE — CONSULT NOTE ADULT - ASSESSMENT
PLAN:    -as patient has chronic hypotension and is on midodrine and florinef at baseline, would not have goal of getting hiw blood pressures to "normal (ie 120/80 etc)   -would establish what patient's baseline blood pressures have been in other medical settings and target that as his normal  -would increase midodrine to 10 mg tid  -add florinef  -given low serum albumin would give 1-2 doses of 25% albumin as resuscitation  -lactate was 5.1 on arrival at roughly 8pm yesterday, maciel have been due to hernia that was reduced, may have had element of ischemia prior to reduction  -he will need repeat lactate to  response to treatment  -also K+ of 6.1 noted on admit labs at 8pm last night, needs repeat by primary team  Na+ 126, however patient's history includes chronic hyponatremia, again a baseline should be established to ascertain what Na+ level is considered normal for this patient prior to considering treatment   -osmin RICO as per surgical note  -CT imaging with likely renal cell carcinoma, will need nephro/uro evaluation and discussion with patient/family regarding goals of care  -is already on broad spectrum anbx  -follow cultures    -once baseline values for patient's "normal "BPs and Sodium levels are establsihed by team, would aim to reach these goals if not already met  -if these goals still are not met, ICU can be -reconsulted at that time    -will need repeat of labs prior to reconsult     -will sign out to day ICU team as well to f/u

## 2022-08-07 NOTE — PATIENT PROFILE ADULT - FALL HARM RISK - HARM RISK INTERVENTIONS

## 2022-08-07 NOTE — GOALS OF CARE CONVERSATION - ADVANCED CARE PLANNING - CONVERSATION DETAILS
full code, prior MOLST IN CHART.   spoke with emerg contact Marialuisa, sister in [place].  patient was living by himself in his apartment, last nov had bowel surgery/ ileostomy likely due to IBD, at [facility], ans was sent to [facility] at , now in long term care at .   ambulates a little with difficulty with walker.   GOC discussed with sister, but she does not have any idea about code status for patient, says patient makes his own decisions, can revisit GOC at some point once medically more stable.   time spent in GOC discussion 16 min

## 2022-08-07 NOTE — H&P ADULT - NSICDXFAMILYHX_GEN_ALL_CORE_FT
FAMILY HISTORY:  Father  Still living? No  Family history of CVA, Age at diagnosis: Age Unknown    Mother  Still living? No  FH: breast cancer, Age at diagnosis: Age Unknown

## 2022-08-07 NOTE — PROGRESS NOTE ADULT - ASSESSMENT
Impression/Plan:  CINTHYA GUZMAN is a 63yo man with SBO caused by an incarcerated right inguinal hernia.    #SBO 2/to RIGHT INGUINAL HERNIA  - Hernia reduced at bedside.   - Serial abdominal examinations.   - NGT removed. Start clear liquids.   - Recommended to patient that he consider inguinal hernia repair in the near future.   - May need to wait until excoriations on right groin from ileostomy output irritation heals.   - Would recommend wound care consultation for counseling on good ostomy appliance application.   - Would appreciate medical optimization in anticipation of surgery.      --------------------------------------------------------    Subjective:  Significant flatus and output from ileostomy overnight. No nausea or vomiting. Feels like he wants to try some liquids.     Objective:  T(C): 36.4, Max: 36.9 (08-06-22 @ 20:30)  HR: 102 (102 - 117)  BP: 87/50 (81/55 - 107/66)  RR: 18 (18 - 20)  SpO2: 100% (99% - 100%)    Physical Exam  NGT in place with minimal output.   ABD less distended.   No tenderness.   RLQ ileostomy with gas and liquid stool.     Laboratory Data  AM labs pending.     Imaging Data  CT ABD/PEL 8/6/22:  SBO with transition at right inguinal hernia.

## 2022-08-07 NOTE — H&P ADULT - NSHPLABSRESULTS_GEN_ALL_CORE
x                    126  | 18   | 61           x     >-----------< x       ------------------------< 169                   x                    6.1  | 98   | 1.32                                         Ca 7.0   Mg x     Ph x            CAPILLARY BLOOD GLUCOSE      Lactate, Blood: 5.1 mmol/L (08.06.22 @ 20:05)      INR: 0.97 (08.06.22 @ 19:30)    COVID-19 PCR: [employer]: You can help in the fight against COVID-19. [employer] may contact  you to see if you are interested in voluntarily participating in one of  our clinical trials.  This test has been validated by [employer] to be accurate;  though it has not been FDA cleared/approved by the usual pathway  As with all laboratory test, results should be correlated with clinical  findings.  https://www.fda.gov/media/430842/download  https://www.fda.gov/media/801540/download (08.06.22 @ 19:30)      Lipase, Serum: 88 U/L (08.06.22 @ 19:30)    Labs reviewed:     CXR personally reviewed: napd    < from: CT Abdomen and Pelvis w/ IV Cont (08.06.22 @ 21:35) >    IMPRESSION:    4.0 x 3.4 cm left renal midpole enhancing mass highly suspicious for   renal cell carcinoma. Urological evaluation is recommended.    Small bowel obstruction secondary to right inguinal hernia as described   above.    ECG reviewed and interpreted: sinus tachy 128

## 2022-08-07 NOTE — ED PROVIDER NOTE - CLINICAL SUMMARY MEDICAL DECISION MAKING FREE TEXT BOX
63 yo M hx of schizophrenia, HTN, Afib, ileostomy, hypothyroidism, chronic hyponatremia, chronic hypotension (on midodrine and florinef), HLD, presents from NH for severe, diffuse abdominal pain with nausea, progressively worse, no radiation. Pt reports decreased output in ileostomy. exam as noted. ddx: bowel obstruction, incarcerated vs strangulated hernia, sepsis, volvulus, electrolyte abnormality. labs, CT, anticipate admission +/- surgery consult.   *The above represents an initial assessment/impression. Please refer to progress notes for potential changes in patient clinical course*

## 2022-08-07 NOTE — ED PROVIDER NOTE - OBJECTIVE STATEMENT
61 yo M hx of schizophrenia, HTN, Afib, ileostomy, hypothyroidism, chronic hyponatremia, chronic hypotension (on midodrine and florinef), HLD, presents from NH for severe, diffuse abdominal pain with nausea, progressively worse, no radiation. Pt reports decreased output in ileostomy. Pt states his blood pressure is usually 90. Pt reports R inguinal hernia unchanged in size since after ileostomy.

## 2022-08-07 NOTE — H&P ADULT - HISTORY OF PRESENT ILLNESS
62 y o m with h/o schizophrenia, htn, Afib, ileostomy, hypothyroidism, chronic hyponatremia hypotension on midodrine and florinef at [facility], pancreatic insufficiency, HLD, BIB EMS from [facility] for c/o generalized abd pain, 8/10, associated with with multiple episodes of n/v since yesterday. Per EMS, staff report they think there is an issue with his ileostomy has had minimal drainage since yesterday.    in ED patient c/o n/v, dark color emitus, NGT placed, noted with severe sepsis, hyperkalemia, hyponatremia, hypotension, patient reports his BP usually runs low in the 90's.  received 2L iv fluids in ED so far with little improvement in BP , remains in 90's ctap- with SBO  seen by dr. hayward, inguinal hernia reduced in the ED, does not need surgery at this moment, c/w medical management and NGT to suction.  icu consulted    denies fever, loc, vision changes, CP, sob.       62 y o m with h/o schizophrenia, htn, Afib, ileostomy, hypothyroidism, chronic hyponatremia hypotension on midodrine and florinef at [facility], pancreatic insufficiency, HLD, BIB EMS from [facility] for c/o generalized abd pain, 8/10, associated with with multiple episodes of n/v since yesterday. Per EMS, staff report they think there is an issue with his ileostomy has had minimal drainage since yesterday.    in ED patient c/o n/v, NGT placed with dark color emitus,    patient noted with severe sepsis+, hyperkalemia, hyponatremia, hypotension, patient reports his BP usually runs low in the 90's.  received 2L iv fluids in ED so far with little improvement in BP , remains in 90's ctap- with SBO  seen by dr. hayward, inguinal hernia reduced in the ED, does not need surgery at this moment, c/w medical management and NGT to suction.  icu consulted    denies fever, loc, vision changes, CP, sob.

## 2022-08-07 NOTE — ED PROVIDER NOTE - CRITICAL CARE ATTENDING CONTRIBUTION TO CARE
Patient was critically ill with a high probability of imminent or life threatening deterioration.  I have performed direct patient care (not related to procedure), additional history taking, interpretation of diagnostic studies, documentation, consultation with other physicians, telephone consultation with the patient's family.   I have personally and independently provided the amount of critical care time documented below excluding time spent on separate procedures.

## 2022-08-07 NOTE — ED ADULT NURSE REASSESSMENT NOTE - NS ED NURSE REASSESS COMMENT FT1
phone call received from surgery team Dr. Wade, ordered to pull NG tube 15 cm out as the NG tube is deep inside, order carried out.

## 2022-08-07 NOTE — PROGRESS NOTE ADULT - SUBJECTIVE AND OBJECTIVE BOX
CC: Patient is a 62y old  Male who presents with a chief complaint of Incarcerated right inguinal hernia with SBO (07 Aug 2022 07:11)      S: Patient was ICU consult overnight for persistent lactic acidosis and hypotension. Plan formed to establish patients baseline blood pressures and set that as target while continuing resuscitation efforts. Source of lactate remains in question however there is presumptive relation to right inguinal hernia that is status post reduction by gen surgery and SBO appreciate by CT abd/pel. Patient now with stool into ileostomy collection bag which may represent resolution of SBO.  Patient seen and examined at bedside. Hospital attending re-consulted this morning requesting ICU transfer.    ALLERGIES:  No Known Allergies      MEDICATIONS:  acetaminophen     Tablet .. 650 milliGRAM(s) Oral every 6 hours PRN  aluminum hydroxide/magnesium hydroxide/simethicone Suspension 30 milliLiter(s) Oral every 4 hours PRN  benztropine Injectable 2 milliGRAM(s) IV Push daily  busPIRone 5 milliGRAM(s) Oral two times a day  calcitriol   Capsule 0.25 MICROGram(s) Oral daily  digoxin  Injectable 125 MICROGram(s) IV Push daily  fludroCORTISONE 0.1 milliGRAM(s) Oral daily  fluPHENAZine  Injectable 2.5 milliGRAM(s) IntraMuscular two times a day  hydrocortisone sodium succinate Injectable 100 milliGRAM(s) IV Push every 8 hours  levothyroxine Injectable 25 MICROGram(s) IV Push at bedtime  melatonin 3 milliGRAM(s) Oral at bedtime PRN  midodrine. 10 milliGRAM(s) Oral three times a day  ondansetron Injectable 4 milliGRAM(s) IV Push every 8 hours PRN  pancrelipase Oral Capsule (CREON 12,000 Lipase Units) - Peds 2 Capsule(s) Oral three times a day with meals  pantoprazole  Injectable 40 milliGRAM(s) IV Push two times a day  piperacillin/tazobactam IVPB.. 3.375 Gram(s) IV Intermittent every 8 hours        Vital Signs Last 24 Hrs  T(F): 97.3 (07 Aug 2022 07:30), Max: 98.5 (06 Aug 2022 20:30)  HR: 105 (07 Aug 2022 11:02) (96 - 117)  BP: 90/60 (07 Aug 2022 11:02) (81/55 - 107/66)  RR: 14 (07 Aug 2022 11:02) (14 - 20)  SpO2: 98% (07 Aug 2022 11:02) (97% - 100%)  I&O's Summary    06 Aug 2022 07:01  -  07 Aug 2022 07:00  --------------------------------------------------------  IN: 0 mL / OUT: 1430 mL / NET: -1430 mL    07 Aug 2022 07:01  -  07 Aug 2022 11:06  --------------------------------------------------------  IN: 0 mL / OUT: 500 mL / NET: -500 mL        PHYSICAL EXAM:  General: NAD  ENT: MMM  Neck: Supple, No JVD  Lungs: Clear to auscultation bilaterally  Cardio: RRR, S1/S2, No murmurs  Abdomen: Ileostomy to RLQ with pink stoma and stool in collection bag, abd soft, nontender, nondistended, bowel sounds present  Extremities: No cyanosis, No edema  Neuro: no new deficits, tremor to RUE.  Skin: no rashes  Psych: AAO    LABS:                        13.7   18.10 )-----------( 512      ( 06 Aug 2022 19:30 )             41.0     08-07    127  |  100  |  63  ----------------------------<  129  5.5   |  17  |  1.45    Ca    7.0      07 Aug 2022 02:00  Phos  5.7     08-07    TPro  4.1  /  Alb  1.3  /  TBili  0.2  /  DBili  x   /  AST  47  /  ALT  69  /  AlkPhos  78  08-06      PT/INR - ( 06 Aug 2022 19:30 )   PT: 11.2 sec;   INR: 0.97 ratio         PTT - ( 06 Aug 2022 19:30 )  PTT:18.6 sec  Lactate, Blood: 4.2 mmol/L ( @ 02:00)  Lactate, Blood: 5.1 mmol/L ( @ 20:05)                      POCT Blood Glucose.: 151 mg/dL (07 Aug 2022 00:07)      Urinalysis Basic - ( 07 Aug 2022 07:15 )    Color: Yellow / Appearance: Clear / S.015 / pH: x  Gluc: x / Ketone: Negative  / Bili: Negative / Urobili: Negative   Blood: x / Protein: Negative / Nitrite: Negative   Leuk Esterase: Negative / RBC: x / WBC x   Sq Epi: x / Non Sq Epi: x / Bacteria: x        COVID-19 PCR: NotDetec (22 @ 19:30)      RADIOLOGY & ADDITIONAL TESTS:    Care Discussed with Consultants/Other Providers:    CC: Patient is a 62y old  Male who presents with a chief complaint of Incarcerated right inguinal hernia with SBO (07 Aug 2022 07:11)    HPI:  62 y o m with h/o schizophrenia, htn, Afib, ileostomy, hypothyroidism, chronic hyponatremia hypotension on midodrine and florinef at [facility], pancreatic insufficiency, HLD, BIB EMS from [facility] for c/o generalized abd pain, 8/10, associated with with multiple episodes of n/v since yesterday. Per EMS, staff report they think there is an issue with his ileostomy has had minimal drainage since yesterday.    ICU consult called as patient borderline bp and persistent lactic acidosis.      S: Patient was ICU consult overnight for persistent lactic acidosis and hypotension. Plan formed to establish patients baseline blood pressures and set that as target while continuing resuscitation efforts. Source of lactate remains in question however there is presumptive relation to right inguinal hernia that is status post reduction by gen surgery and SBO appreciate by CT abd/pel. Patient now with stool into ileostomy collection bag which may represent resolution of SBO.  Patient seen and examined at bedside. Hospital attending re-consulted this morning requesting ICU transfer.      PAST MEDICAL & SURGICAL HISTORY:  Atrial fibrillation  Heart failure  History of hypotension  Generalized anxiety disorder  Type 2 diabetes mellitus  Presence of ileostomy  S/P cholecystectomy      Social History:  denies smoking, no etoh (07 Aug 2022 00:20)    FAMILY HISTORY:  FH: breast cancer (Mother)    Family history of CVA (Father)            ALLERGIES:  No Known Allergies      MEDICATIONS:  acetaminophen     Tablet .. 650 milliGRAM(s) Oral every 6 hours PRN  aluminum hydroxide/magnesium hydroxide/simethicone Suspension 30 milliLiter(s) Oral every 4 hours PRN  benztropine Injectable 2 milliGRAM(s) IV Push daily  busPIRone 5 milliGRAM(s) Oral two times a day  calcitriol   Capsule 0.25 MICROGram(s) Oral daily  digoxin  Injectable 125 MICROGram(s) IV Push daily  fludroCORTISONE 0.1 milliGRAM(s) Oral daily  fluPHENAZine  Injectable 2.5 milliGRAM(s) IntraMuscular two times a day  hydrocortisone sodium succinate Injectable 100 milliGRAM(s) IV Push every 8 hours  levothyroxine Injectable 25 MICROGram(s) IV Push at bedtime  melatonin 3 milliGRAM(s) Oral at bedtime PRN  midodrine. 10 milliGRAM(s) Oral three times a day  ondansetron Injectable 4 milliGRAM(s) IV Push every 8 hours PRN  pancrelipase Oral Capsule (CREON 12,000 Lipase Units) - Peds 2 Capsule(s) Oral three times a day with meals  pantoprazole  Injectable 40 milliGRAM(s) IV Push two times a day  piperacillin/tazobactam IVPB.. 3.375 Gram(s) IV Intermittent every 8 hours    Home Medications:  atorvastatin 20 mg oral tablet: 1 tab(s) orally once a day (07 Aug 2022 00:56)  benztropine 2 mg oral tablet:  (07 Aug 2022 00:56)  busPIRone 5 mg oral tablet: 1 tab(s) orally 2 times a day (07 Aug 2022 00:56)  calcitriol 0.25 mcg oral capsule: 1 cap(s) orally once a day (07 Aug 2022 00:56)  digoxin 125 mcg (0.125 mg) oral tablet: 1 tab(s) orally once a day (07 Aug 2022 00:56)  Eliquis 2.5 mg oral tablet: 1 tab(s) orally 2 times a day (07 Aug 2022 00:56)  fludrocortisone 0.1 mg oral tablet:  (07 Aug 2022 00:56)  fluPHENAZine hydrochloride:  (07 Aug 2022 00:56)  Levoxyl 50 mcg (0.05 mg) oral tablet: 1 tab(s) orally once a day (07 Aug 2022 00:56)  midodrine 5 mg oral tablet:  (07 Aug 2022 00:56)  Pancreaze:  (07 Aug 2022 00:56)  predniSONE 20 mg oral tablet: 1 tab(s) orally once a day (07 Aug 2022 00:56)      Vital Signs Last 24 Hrs  T(F): 97.3 (07 Aug 2022 07:30), Max: 98.5 (06 Aug 2022 20:30)  HR: 105 (07 Aug 2022 11:02) (96 - 117)  BP: 90/60 (07 Aug 2022 11:02) (81/55 - 107/66)  RR: 14 (07 Aug 2022 11:02) (14 - 20)  SpO2: 98% (07 Aug 2022 11:02) (97% - 100%)  I&O's Summary    06 Aug 2022 07:01  -  07 Aug 2022 07:00  --------------------------------------------------------  IN: 0 mL / OUT: 1430 mL / NET: -1430 mL    07 Aug 2022 07:01  -  07 Aug 2022 11:06  --------------------------------------------------------  IN: 0 mL / OUT: 500 mL / NET: -500 mL        PHYSICAL EXAM:  General: NAD  ENT: MMM  Neck: Supple, No JVD  Lungs: Clear to auscultation bilaterally  Cardio: RRR, S1/S2, No murmurs  Abdomen: Ileostomy to RLQ with pink stoma and stool in collection bag, abd soft, nontender, nondistended, bowel sounds present  Extremities: No cyanosis, No edema  Neuro: no new deficits, tremor to RUE.  Skin: no rashes  Psych: AAO    LABS:                        13.7   18.10 )-----------( 512      ( 06 Aug 2022 19:30 )             41.0     08-07    127  |  100  |  63  ----------------------------<  129  5.5   |  17  |  1.45    Ca    7.0      07 Aug 2022 02:00  Phos  5.7     08-    TPro  4.1  /  Alb  1.3  /  TBili  0.2  /  DBili  x   /  AST  47  /  ALT  69  /  AlkPhos  78  08-06      PT/INR - ( 06 Aug 2022 19:30 )   PT: 11.2 sec;   INR: 0.97 ratio         Lactic Acid Trend  22 @ 12:05   -   2.4<H>  22 @ 02:00   -   4.2<HH>  22 @ 20:05   -   5.1<HH>           Urinalysis Basic - ( 07 Aug 2022 07:15 )    Color: Yellow / Appearance: Clear / S.015 / pH: x  Gluc: x / Ketone: Negative  / Bili: Negative / Urobili: Negative   Blood: x / Protein: Negative / Nitrite: Negative   Leuk Esterase: Negative / RBC: x / WBC x   Sq Epi: x / Non Sq Epi: x / Bacteria: x       < from: CT Abdomen and Pelvis w/ IV Cont (22 @ 21:35) >    IMPRESSION:    4.0 x 3.4 cm left renal midpole enhancing mass highly suspicious for   renal cell carcinoma. Urological evaluation is recommended.    Small bowel obstruction secondary to right inguinal hernia as described   above.      --- End of Report ---    < end of copied text >

## 2022-08-07 NOTE — ED ADULT NURSE REASSESSMENT NOTE - NS ED NURSE REASSESS COMMENT FT1
critical report received from the lab, lactate - 4.2 notified by Jesica weathers from lab. Report notified to MD Webber @ 02:46

## 2022-08-07 NOTE — ED PROVIDER NOTE - PROGRESS NOTE DETAILS
Arielle Martin MD, Attending Arielle Martin MD, Attending  Spoke Dr. Stewart, coming to see patient in ED. Spoke to hospitalist, requesting ICU consult. ICU called 3x, no answer.

## 2022-08-07 NOTE — PROGRESS NOTE ADULT - NS ATTEND AMEND GEN_ALL_CORE FT
pt seen and examined  comfortable   lactic acidosis improving  bp improving    will monitor in ICU for 24 hours for close bp monitoring. pt seen and examined  comfortable   lactic acidosis improving  bp improving  suspect lactic acidosis from hypoperfusion to incarcerated hernia, and slow downtrend from reperfusion   patient does not require pressor at this time  will monitor in ICU for 24 hours for close bp monitoring.

## 2022-08-07 NOTE — PROGRESS NOTE ADULT - ASSESSMENT
ASSESSMENT:  1. Incarcerated vs Strangulated right inguinal hernia  2. SBO related to above  3. PMHx Schizophrenia, Hyponatremia, Hypothyroidism      PLAN:  - Transfer to ICU  - Continue resuscitation with IVF, careful consideration for volume status  - Continue Midodrine 10mg TID and Florinef 0.1 QD, low threshold for vasopressors should lactate and hypotension worsen  - Monitor Ileostomy output  - Trend labs and supplement lytes, including lactate  - Monitor I&Os, avoid nephrotoxins, renal dose meds  - Surgical follow-up for continued management of SBO/inguinal hernia/ileostomy  - Continue empiric Zosyn  - Continue home meds unless contraindicated  - Advanced to clear liquid diet prior to ICU transfer, continue  - PPX: Protonix, no AC  - GOC: Full CODE     ASSESSMENT:  1. Incarcerated vs Strangulated right inguinal hernia s/p manual reduction/decompression  2. SBO related to above  3. Lactic acidosis due to above  4. PMHx Schizophrenia, Hyponatremia, Hypothyroidism      PLAN:  - Bedside team requested closer monitoring, will monitor in ICU    - Continue resuscitation with IVF, careful consideration for volume status  - Continue Midodrine 10mg TID and Florinef 0.1 QD, low threshold for vasopressors should lactate and hypotension worsen  - Monitor Ileostomy output  - Trend labs and supplement lytes, including lactate  - Monitor I&Os, avoid nephrotoxins, renal dose meds  - Surgical follow-up for continued management of SBO/inguinal hernia/ileostomy  - Continue empiric Zosyn  - Continue home meds unless contraindicated  - Advanced to clear liquid diet prior to ICU transfer, continue  - PPX: Protonix, no AC  - GOC: Full CODE

## 2022-08-07 NOTE — CONSULT NOTE ADULT - SUBJECTIVE AND OBJECTIVE BOX
Patient is a 62y old  Male who presents with a chief complaint of Incarcerated right inguinal hernia with SBO (06 Aug 2022 23:43)      BRIEF HOSPITAL COURSE:     ***taken from H and P***    62 y o m with h/o schizophrenia, htn, Afib, ileostomy, hypothyroidism, chronic hyponatremia hypotension on midodrine and florinef at [facility], pancreatic insufficiency, HLD, BIB EMS from [facility] for c/o generalized abd pain, 8/10, associated with with multiple episodes of n/v since yesterday. Per EMS, staff report they think there is an issue with his ileostomy has had minimal drainage since yesterday.    in ED patient c/o n/v, NGT placed with dark color emitus,    patient noted with severe sepsis+, hyperkalemia, hyponatremia, hypotension, patient reports his BP usually runs low in the 90's.  received 2L iv fluids in ED so far with little improvement in BP , remains in 90's ctap- with SBO  seen by dr. hayward, inguinal hernia reduced in the ED, does not need surgery at this moment, c/w medical management and NGT to suction.          PAST MEDICAL & SURGICAL HISTORY:  Atrial fibrillation      Heart failure      History of hypotension      Generalized anxiety disorder      Type 2 diabetes mellitus      Presence of ileostomy      S/P cholecystectomy        Allergies    No Known Allergies    Intolerances      FAMILY HISTORY:  FH: breast cancer (Mother)    Family history of CVA (Father)        Family history otherwise noncontributory.    Social History: ***  Review of Systems:    ALL OTHER REVIEW OF SYSTEMS EXCEPT PER HPI NEGATIVE.      Medications:  piperacillin/tazobactam IVPB.. 3.375 Gram(s) IV Intermittent every 8 hours    digoxin  Injectable 125 MICROGram(s) IV Push daily  midodrine. 10 milliGRAM(s) Oral three times a day      acetaminophen     Tablet .. 650 milliGRAM(s) Oral every 6 hours PRN  benztropine Injectable 2 milliGRAM(s) IV Push daily  busPIRone 5 milliGRAM(s) Oral two times a day  fluPHENAZine  Injectable 2.5 milliGRAM(s) IntraMuscular two times a day  melatonin 3 milliGRAM(s) Oral at bedtime PRN  ondansetron Injectable 4 milliGRAM(s) IV Push every 8 hours PRN        aluminum hydroxide/magnesium hydroxide/simethicone Suspension 30 milliLiter(s) Oral every 4 hours PRN  pancrelipase Oral Capsule (CREON 12,000 Lipase Units) - Peds 2 Capsule(s) Oral three times a day with meals  pantoprazole  Injectable 40 milliGRAM(s) IV Push two times a day      fludroCORTISONE 0.1 milliGRAM(s) Oral daily  hydrocortisone sodium succinate Injectable 100 milliGRAM(s) IV Push every 8 hours  levothyroxine Injectable 25 MICROGram(s) IV Push at bedtime    albumin human 25% IVPB 100 milliLiter(s) IV Intermittent every 1 hour  calcitriol   Capsule 0.25 MICROGram(s) Oral daily                ICU Vital Signs Last 24 Hrs  T(C): 36.4 (07 Aug 2022 03:54), Max: 36.9 (06 Aug 2022 20:30)  T(F): 97.6 (07 Aug 2022 03:54), Max: 98.5 (06 Aug 2022 20:30)  HR: 102 (07 Aug 2022 03:54) (102 - 117)  BP: 87/50 (07 Aug 2022 03:54) (81/55 - 107/66)  BP(mean): 62 (07 Aug 2022 03:54) (62 - 62)  RR: 18 (07 Aug 2022 03:54) (18 - 20)  SpO2: 100% (07 Aug 2022 03:54) (99% - 100%)    O2 Parameters below as of 07 Aug 2022 03:54  Patient On (Oxygen Delivery Method): room air          Vital Signs Last 24 Hrs  T(C): 36.4 (07 Aug 2022 03:54), Max: 36.9 (06 Aug 2022 20:30)  T(F): 97.6 (07 Aug 2022 03:54), Max: 98.5 (06 Aug 2022 20:30)  HR: 102 (07 Aug 2022 03:54) (102 - 117)  BP: 87/50 (07 Aug 2022 03:54) (81/55 - 107/66)  BP(mean): 62 (07 Aug 2022 03:54) (62 - 62)  RR: 18 (07 Aug 2022 03:54) (18 - 20)  SpO2: 100% (07 Aug 2022 03:54) (99% - 100%)    Parameters below as of 07 Aug 2022 03:54  Patient On (Oxygen Delivery Method): room air            I&O's Detail    06 Aug 2022 07:01  -  07 Aug 2022 04:34  --------------------------------------------------------  IN:  Total IN: 0 mL    OUT:    Colostomy (mL): 1130 mL  Total OUT: 1130 mL    Total NET: -1130 mL            LABS:                        13.7   18.10 )-----------( 512      ( 06 Aug 2022 19:30 )             41.0     08-06    126<L>  |  98  |  61<H>  ----------------------------<  169<H>  6.1<H>   |  18<L>  |  1.32<H>    Ca    7.0<L>      06 Aug 2022 20:05    TPro  4.1<L>  /  Alb  1.3<L>  /  TBili  0.2  /  DBili  x   /  AST  47<H>  /  ALT  69<H>  /  AlkPhos  78  08-06          CAPILLARY BLOOD GLUCOSE      POCT Blood Glucose.: 151 mg/dL (07 Aug 2022 00:07)    PT/INR - ( 06 Aug 2022 19:30 )   PT: 11.2 sec;   INR: 0.97 ratio         PTT - ( 06 Aug 2022 19:30 )  PTT:18.6 sec    CULTURES:      Physical Examination:    GENERAL: No acute distress.  NGT in place    EYES: Pupils equal, reactive to light.  Symmetric.    EARS, NOSE, THROAT: Normal; supple neck, no lymphadenopathy; trachea midline    PULM: Clear to auscultation bilaterally, no significant sputum production    CVS: Regular rate and rhythm, no murmurs, rubs, or gallops    GI: Soft, nondistended, nontender, normoactive bowel sounds, no masses, no guarding    EXTREMITIES: No edema    SKIN: Warm and well perfused, no rashes noted.    NEURO: Alert, oriented, interactive, nonfocal

## 2022-08-07 NOTE — H&P ADULT - ASSESSMENT
62 y o m with h/o schizophrenia, htn, Afib, ileostomy, hypothyroidism, chronic hyponatremia hypotension on midodrine and florinef at [facility], pancreatic insufficiency, HLD, BIB EMS from [facility] for c/o generalized abd pain, 8/10, associated with with multiple episodes of n/v since yesterday. Per EMS, staff report they think there is an issue with his ileostomy has had minimal drainage since yesterday.   ctap- with SBO secondary to right inguinal hernia, seen by dr. Stewart, inguinal hernia reduced in the ED, does not need surgery at this moment, c/w medical management and NGT to suction.    severe sepsis with septic shock likely secondary to SBO  secondary to right inguinal hernia / HYPOTENSION  electrolyte imbalance- hyperkalemia, hyponatremia   admit to tele for now will monitor for upgrade to icu,  pending icu consult  iv fluids  BC X 2, UA C/S,   received zosyn and vanc ion ED, will c/w zosyn   c/w salt tabs 2gm qd  received insulin and d50, jorge gluc, and Lasix 40 ivp for hyperkalemia in ED  repeat bmp, lactate  now and in am  surg consult- d/w dr. stewart, medical management, inguinal hernia reduce din ED.  npo  NGT to wall suction  pain meds with caution due to hypotension  icu consulted    hypotension- SBP IN 90'S  iv fluids- received 2 L bolus in ED, will order 2 more L NS NOW  will order Solu-cortef 500 mg ivp x 1 now and 100 q8 ivp since patient on prednisone 20 at NH  c/w midodrine and florinef    dark vomitus- will send guaiac r/o GI BLEED  will hold Eliquis for now  Protonix 40 ivp q12  monitor hb  am labs    IBD- on prednisone 20 qd, will change to Solu-cortef iv    a fib- c/w digoxin and hold Eliquis    schizophrenia- c/w benztropine, fluphenazine, buspirone    hld - hold statin for now    hypothyroidism- c/w levothyroxine changed to iv    vte - pas    full code, prior MOLST IN CHART.   spoke with emerg contact Marialuisa, sister in [place].  patient was living by himself in his apartment, last nov had bowel surgery/ ileostomy likely due to IBD, at [facility], ans was sent to [facility] at , now in long term care at .   ambulates a little with difficulty with walker.   GOC discussed with sister, but she does not have any idea about code status for patient, says patient makes his own decisions, can revisit GOC at some point once medically more stable.   time spent in GOC discussion 16 min                     62 y o m with h/o schizophrenia, htn, Afib, ileostomy, hypothyroidism, chronic hyponatremia hypotension on midodrine and florinef at [facility], pancreatic insufficiency, HLD, BIB EMS from [facility] for c/o generalized abd pain, 8/10, associated with with multiple episodes of n/v since yesterday. Per EMS, staff report they think there is an issue with his ileostomy has had minimal drainage since yesterday.   ctap- with SBO secondary to right inguinal hernia, seen by dr. Stewart, inguinal hernia reduced in the ED, does not need surgery at this moment, c/w medical management and NGT to suction.    severe sepsis with septic shock likely secondary to SBO  secondary to right inguinal hernia / HYPOTENSION  electrolyte imbalance- hyperkalemia, hyponatremia   admit to tele for now will monitor for upgrade to icu,  pending icu consult  iv fluids  BC X 2, UA C/S,   received zosyn and vanc ion ED, will c/w zosyn   c/w salt tabs 2gm qd  received insulin and d50, jorge gluc, and Lasix 40 ivp for hyperkalemia in ED  repeat bmp, lactate  now and in am  surg consult- d/w dr. stewart, medical management, inguinal hernia reduce din ED.  npo  NGT to wall suction  pain meds with caution due to hypotension  icu consulted- d/w dr. reyes-  [facility] to repeat labs and if no improvement contact surgeon again    hypotension- SBP IN 90'S  iv fluids- received 2 L bolus in ED, will order 2 more L NS NOW  will order Solu-cortef 500 mg ivp x 1 now and 100 q8 ivp since patient on prednisone 20 at NH  c/w midodrine and florinef    dark vomitus- will send guaiac r/o GI BLEED  will hold Eliquis for now  Protonix 40 ivp q12  monitor hb  am labs    IBD- on prednisone 20 qd, will change to Solu-cortef iv    a fib- c/w digoxin and hold Eliquis    schizophrenia- c/w benztropine, fluphenazine, buspirone    hld - hold statin for now    hypothyroidism- c/w levothyroxine changed to iv    vte - pas    full code, prior MOLST IN CHART.   spoke with emerg contact Marialuisa, sister in [place].  patient was living by himself in his apartment, last nov had bowel surgery/ ileostomy likely due to IBD, at [facility], ans was sent to [facility] at , now in long term care at .   ambulates a little with difficulty with walker.   GOC discussed with sister, but she does not have any idea about code status for patient, says patient makes his own decisions, can revisit GOC at some point once medically more stable.   time spent in GOC discussion 16 min

## 2022-08-08 LAB
ALBUMIN SERPL ELPH-MCNC: 1.6 G/DL — LOW (ref 3.3–5)
ALP SERPL-CCNC: 46 U/L — SIGNIFICANT CHANGE UP (ref 40–120)
ALT FLD-CCNC: 36 U/L — SIGNIFICANT CHANGE UP (ref 10–45)
ANION GAP SERPL CALC-SCNC: 9 MMOL/L — SIGNIFICANT CHANGE UP (ref 5–17)
AST SERPL-CCNC: 23 U/L — SIGNIFICANT CHANGE UP (ref 10–40)
BASOPHILS # BLD AUTO: 0.01 K/UL — SIGNIFICANT CHANGE UP (ref 0–0.2)
BASOPHILS NFR BLD AUTO: 0.1 % — SIGNIFICANT CHANGE UP (ref 0–2)
BILIRUB SERPL-MCNC: 0.4 MG/DL — SIGNIFICANT CHANGE UP (ref 0.2–1.2)
BUN SERPL-MCNC: 45 MG/DL — HIGH (ref 7–23)
CALCIUM SERPL-MCNC: 6.9 MG/DL — LOW (ref 8.4–10.5)
CHLORIDE SERPL-SCNC: 99 MMOL/L — SIGNIFICANT CHANGE UP (ref 96–108)
CO2 SERPL-SCNC: 21 MMOL/L — LOW (ref 22–31)
CREAT SERPL-MCNC: 1.05 MG/DL — SIGNIFICANT CHANGE UP (ref 0.5–1.3)
CULTURE RESULTS: NO GROWTH — SIGNIFICANT CHANGE UP
EGFR: 81 ML/MIN/1.73M2 — SIGNIFICANT CHANGE UP
EOSINOPHIL # BLD AUTO: 0.02 K/UL — SIGNIFICANT CHANGE UP (ref 0–0.5)
EOSINOPHIL NFR BLD AUTO: 0.3 % — SIGNIFICANT CHANGE UP (ref 0–6)
GLUCOSE BLDC GLUCOMTR-MCNC: 76 MG/DL — SIGNIFICANT CHANGE UP (ref 70–99)
GLUCOSE SERPL-MCNC: 84 MG/DL — SIGNIFICANT CHANGE UP (ref 70–99)
HCT VFR BLD CALC: 25.6 % — LOW (ref 39–50)
HCT VFR BLD CALC: 27.2 % — LOW (ref 39–50)
HGB BLD-MCNC: 8.5 G/DL — LOW (ref 13–17)
HGB BLD-MCNC: 9.1 G/DL — LOW (ref 13–17)
IMM GRANULOCYTES NFR BLD AUTO: 1.2 % — SIGNIFICANT CHANGE UP (ref 0–1.5)
LACTATE SERPL-SCNC: 0.6 MMOL/L — LOW (ref 0.7–2)
LYMPHOCYTES # BLD AUTO: 0.78 K/UL — LOW (ref 1–3.3)
LYMPHOCYTES # BLD AUTO: 10.6 % — LOW (ref 13–44)
MAGNESIUM SERPL-MCNC: 1.1 MG/DL — LOW (ref 1.6–2.6)
MCHC RBC-ENTMCNC: 29.4 PG — SIGNIFICANT CHANGE UP (ref 27–34)
MCHC RBC-ENTMCNC: 29.9 PG — SIGNIFICANT CHANGE UP (ref 27–34)
MCHC RBC-ENTMCNC: 33.2 GM/DL — SIGNIFICANT CHANGE UP (ref 32–36)
MCHC RBC-ENTMCNC: 33.5 GM/DL — SIGNIFICANT CHANGE UP (ref 32–36)
MCV RBC AUTO: 88.6 FL — SIGNIFICANT CHANGE UP (ref 80–100)
MCV RBC AUTO: 89.5 FL — SIGNIFICANT CHANGE UP (ref 80–100)
MONOCYTES # BLD AUTO: 1.03 K/UL — HIGH (ref 0–0.9)
MONOCYTES NFR BLD AUTO: 14 % — SIGNIFICANT CHANGE UP (ref 2–14)
NEUTROPHILS # BLD AUTO: 5.41 K/UL — SIGNIFICANT CHANGE UP (ref 1.8–7.4)
NEUTROPHILS NFR BLD AUTO: 73.8 % — SIGNIFICANT CHANGE UP (ref 43–77)
NRBC # BLD: 0 /100 WBCS — SIGNIFICANT CHANGE UP (ref 0–0)
NRBC # BLD: 0 /100 WBCS — SIGNIFICANT CHANGE UP (ref 0–0)
PHOSPHATE SERPL-MCNC: 4.5 MG/DL — SIGNIFICANT CHANGE UP (ref 2.5–4.5)
PLATELET # BLD AUTO: 273 K/UL — SIGNIFICANT CHANGE UP (ref 150–400)
PLATELET # BLD AUTO: 332 K/UL — SIGNIFICANT CHANGE UP (ref 150–400)
POTASSIUM SERPL-MCNC: 4.2 MMOL/L — SIGNIFICANT CHANGE UP (ref 3.5–5.3)
POTASSIUM SERPL-SCNC: 4.2 MMOL/L — SIGNIFICANT CHANGE UP (ref 3.5–5.3)
PROT SERPL-MCNC: 3.7 G/DL — LOW (ref 6–8.3)
RBC # BLD: 2.89 M/UL — LOW (ref 4.2–5.8)
RBC # BLD: 3.04 M/UL — LOW (ref 4.2–5.8)
RBC # FLD: 16.3 % — HIGH (ref 10.3–14.5)
RBC # FLD: 16.5 % — HIGH (ref 10.3–14.5)
SODIUM SERPL-SCNC: 129 MMOL/L — LOW (ref 135–145)
SPECIMEN SOURCE: SIGNIFICANT CHANGE UP
WBC # BLD: 6.78 K/UL — SIGNIFICANT CHANGE UP (ref 3.8–10.5)
WBC # BLD: 7.34 K/UL — SIGNIFICANT CHANGE UP (ref 3.8–10.5)
WBC # FLD AUTO: 6.78 K/UL — SIGNIFICANT CHANGE UP (ref 3.8–10.5)
WBC # FLD AUTO: 7.34 K/UL — SIGNIFICANT CHANGE UP (ref 3.8–10.5)

## 2022-08-08 PROCEDURE — 99231 SBSQ HOSP IP/OBS SF/LOW 25: CPT

## 2022-08-08 RX ORDER — ONDANSETRON 8 MG/1
1 TABLET, FILM COATED ORAL
Qty: 0 | Refills: 0 | DISCHARGE

## 2022-08-08 RX ORDER — LIPASE/PROTEASE/AMYLASE 16-48-48K
3 CAPSULE,DELAYED RELEASE (ENTERIC COATED) ORAL
Qty: 0 | Refills: 0 | DISCHARGE

## 2022-08-08 RX ORDER — MAGNESIUM SULFATE 500 MG/ML
2 VIAL (ML) INJECTION ONCE
Refills: 0 | Status: COMPLETED | OUTPATIENT
Start: 2022-08-08 | End: 2022-08-08

## 2022-08-08 RX ORDER — MIDODRINE HYDROCHLORIDE 2.5 MG/1
0 TABLET ORAL
Qty: 0 | Refills: 0 | DISCHARGE

## 2022-08-08 RX ORDER — CALCIUM CARBONATE 500(1250)
1 TABLET ORAL
Qty: 0 | Refills: 0 | DISCHARGE

## 2022-08-08 RX ORDER — DIGOXIN 250 MCG
1 TABLET ORAL
Qty: 0 | Refills: 0 | DISCHARGE

## 2022-08-08 RX ORDER — CALCITRIOL 0.5 UG/1
1 CAPSULE ORAL
Qty: 0 | Refills: 0 | DISCHARGE

## 2022-08-08 RX ORDER — DIGOXIN 250 MCG
125 TABLET ORAL DAILY
Refills: 0 | Status: DISCONTINUED | OUTPATIENT
Start: 2022-08-09 | End: 2022-08-10

## 2022-08-08 RX ORDER — CHOLECALCIFEROL (VITAMIN D3) 125 MCG
1 CAPSULE ORAL
Qty: 0 | Refills: 0 | DISCHARGE

## 2022-08-08 RX ORDER — LEVOTHYROXINE SODIUM 125 MCG
2 TABLET ORAL
Qty: 0 | Refills: 0 | DISCHARGE

## 2022-08-08 RX ORDER — PANTOPRAZOLE SODIUM 20 MG/1
40 TABLET, DELAYED RELEASE ORAL
Refills: 0 | Status: DISCONTINUED | OUTPATIENT
Start: 2022-08-08 | End: 2022-08-10

## 2022-08-08 RX ORDER — ATORVASTATIN CALCIUM 80 MG/1
1 TABLET, FILM COATED ORAL
Qty: 0 | Refills: 0 | DISCHARGE

## 2022-08-08 RX ORDER — BENZTROPINE MESYLATE 1 MG
2 TABLET ORAL DAILY
Refills: 0 | Status: DISCONTINUED | OUTPATIENT
Start: 2022-08-09 | End: 2022-08-10

## 2022-08-08 RX ORDER — MULTIVIT-MIN/FERROUS GLUCONATE 9 MG/15 ML
1 LIQUID (ML) ORAL
Qty: 0 | Refills: 0 | DISCHARGE

## 2022-08-08 RX ORDER — LEVOTHYROXINE SODIUM 125 MCG
1 TABLET ORAL
Qty: 0 | Refills: 0 | DISCHARGE

## 2022-08-08 RX ORDER — HEPARIN SODIUM 5000 [USP'U]/ML
5000 INJECTION INTRAVENOUS; SUBCUTANEOUS EVERY 12 HOURS
Refills: 0 | Status: DISCONTINUED | OUTPATIENT
Start: 2022-08-08 | End: 2022-08-08

## 2022-08-08 RX ORDER — APIXABAN 2.5 MG/1
1 TABLET, FILM COATED ORAL
Qty: 0 | Refills: 0 | DISCHARGE

## 2022-08-08 RX ORDER — FERROUS SULFATE 325(65) MG
1 TABLET ORAL
Qty: 0 | Refills: 0 | DISCHARGE

## 2022-08-08 RX ORDER — ASCORBIC ACID 60 MG
1 TABLET,CHEWABLE ORAL
Qty: 0 | Refills: 0 | DISCHARGE

## 2022-08-08 RX ORDER — LEVOTHYROXINE SODIUM 125 MCG
100 TABLET ORAL DAILY
Refills: 0 | Status: DISCONTINUED | OUTPATIENT
Start: 2022-08-08 | End: 2022-08-10

## 2022-08-08 RX ORDER — MAGNESIUM SULFATE 500 MG/ML
2 VIAL (ML) INJECTION ONCE
Refills: 0 | Status: DISCONTINUED | OUTPATIENT
Start: 2022-08-08 | End: 2022-08-08

## 2022-08-08 RX ORDER — FLUDROCORTISONE ACETATE 0.1 MG/1
1 TABLET ORAL
Qty: 0 | Refills: 0 | DISCHARGE

## 2022-08-08 RX ORDER — BENZTROPINE MESYLATE 1 MG
0 TABLET ORAL
Qty: 0 | Refills: 0 | DISCHARGE

## 2022-08-08 RX ORDER — FLUPHENAZINE HYDROCHLORIDE 1 MG/1
1 TABLET, FILM COATED ORAL
Qty: 0 | Refills: 0 | DISCHARGE

## 2022-08-08 RX ORDER — SODIUM CHLORIDE 9 MG/ML
1000 INJECTION, SOLUTION INTRAVENOUS
Refills: 0 | Status: DISCONTINUED | OUTPATIENT
Start: 2022-08-08 | End: 2022-08-10

## 2022-08-08 RX ORDER — LIPASE/PROTEASE/AMYLASE 16-48-48K
0 CAPSULE,DELAYED RELEASE (ENTERIC COATED) ORAL
Qty: 0 | Refills: 0 | DISCHARGE

## 2022-08-08 RX ORDER — LACTOBACILLUS ACIDOPHILUS 100MM CELL
1 CAPSULE ORAL
Qty: 0 | Refills: 0 | DISCHARGE

## 2022-08-08 RX ORDER — FLUDROCORTISONE ACETATE 0.1 MG/1
0 TABLET ORAL
Qty: 0 | Refills: 0 | DISCHARGE

## 2022-08-08 RX ORDER — FLUPHENAZINE HYDROCHLORIDE 1 MG/1
5 TABLET, FILM COATED ORAL
Refills: 0 | Status: DISCONTINUED | OUTPATIENT
Start: 2022-08-08 | End: 2022-08-10

## 2022-08-08 RX ORDER — BENZTROPINE MESYLATE 1 MG
1 TABLET ORAL
Qty: 0 | Refills: 0 | DISCHARGE

## 2022-08-08 RX ORDER — FLUPHENAZINE HYDROCHLORIDE 1 MG/1
0 TABLET, FILM COATED ORAL
Qty: 0 | Refills: 0 | DISCHARGE

## 2022-08-08 RX ADMIN — Medication 100 MICROGRAM(S): at 17:36

## 2022-08-08 RX ADMIN — CALCITRIOL 0.25 MICROGRAM(S): 0.5 CAPSULE ORAL at 12:05

## 2022-08-08 RX ADMIN — FLUPHENAZINE HYDROCHLORIDE 2.5 MILLIGRAM(S): 1 TABLET, FILM COATED ORAL at 05:29

## 2022-08-08 RX ADMIN — PIPERACILLIN AND TAZOBACTAM 25 GRAM(S): 4; .5 INJECTION, POWDER, LYOPHILIZED, FOR SOLUTION INTRAVENOUS at 05:28

## 2022-08-08 RX ADMIN — PANTOPRAZOLE SODIUM 40 MILLIGRAM(S): 20 TABLET, DELAYED RELEASE ORAL at 05:28

## 2022-08-08 RX ADMIN — FLUPHENAZINE HYDROCHLORIDE 5 MILLIGRAM(S): 1 TABLET, FILM COATED ORAL at 17:36

## 2022-08-08 RX ADMIN — Medication 25 GRAM(S): at 16:25

## 2022-08-08 RX ADMIN — Medication 100 MILLIGRAM(S): at 21:07

## 2022-08-08 RX ADMIN — MIDODRINE HYDROCHLORIDE 10 MILLIGRAM(S): 2.5 TABLET ORAL at 05:28

## 2022-08-08 RX ADMIN — SODIUM CHLORIDE 125 MILLILITER(S): 9 INJECTION, SOLUTION INTRAVENOUS at 05:07

## 2022-08-08 RX ADMIN — MIDODRINE HYDROCHLORIDE 10 MILLIGRAM(S): 2.5 TABLET ORAL at 17:36

## 2022-08-08 RX ADMIN — Medication 125 MICROGRAM(S): at 12:03

## 2022-08-08 RX ADMIN — SODIUM CHLORIDE 75 MILLILITER(S): 9 INJECTION, SOLUTION INTRAVENOUS at 06:46

## 2022-08-08 RX ADMIN — Medication 5 MILLIGRAM(S): at 17:36

## 2022-08-08 RX ADMIN — Medication 2 CAPSULE(S): at 12:03

## 2022-08-08 RX ADMIN — MIDODRINE HYDROCHLORIDE 10 MILLIGRAM(S): 2.5 TABLET ORAL at 12:03

## 2022-08-08 RX ADMIN — Medication 100 MILLIGRAM(S): at 05:28

## 2022-08-08 RX ADMIN — Medication 2 MILLIGRAM(S): at 12:03

## 2022-08-08 RX ADMIN — Medication 100 MILLIGRAM(S): at 14:46

## 2022-08-08 RX ADMIN — Medication 2 CAPSULE(S): at 09:49

## 2022-08-08 RX ADMIN — Medication 5 MILLIGRAM(S): at 05:27

## 2022-08-08 RX ADMIN — Medication 2 CAPSULE(S): at 17:36

## 2022-08-08 RX ADMIN — FLUDROCORTISONE ACETATE 0.1 MILLIGRAM(S): 0.1 TABLET ORAL at 05:27

## 2022-08-08 RX ADMIN — PANTOPRAZOLE SODIUM 40 MILLIGRAM(S): 20 TABLET, DELAYED RELEASE ORAL at 17:36

## 2022-08-08 NOTE — DIETITIAN INITIAL EVALUATION ADULT - PERTINENT LABORATORY DATA
08-08    129<L>  |  99  |  45<H>  ----------------------------<  84  4.2   |  21<L>  |  1.05    Ca    6.9<L>      08 Aug 2022 06:10  Phos  4.5     08-08  Mg     1.1     08-08    TPro  3.7<L>  /  Alb  1.6<L>  /  TBili  0.4  /  DBili  x   /  AST  23  /  ALT  36  /  AlkPhos  46  08-08  POCT Blood Glucose.: 76 mg/dL (08-08-22 @ 05:50)

## 2022-08-08 NOTE — DIETITIAN INITIAL EVALUATION ADULT - NSFNSGIIOFT_GEN_A_CORE
08-07-22 @ 07:01  -  08-08-22 @ 07:00  --------------------------------------------------------  OUT:    Colostomy (mL): 2000 mL  Total OUT: 2000 mL    Total NET: -2000 mL

## 2022-08-08 NOTE — DIETITIAN INITIAL EVALUATION ADULT - PERTINENT MEDS FT
MEDICATIONS  (STANDING):  benztropine Injectable 2 milliGRAM(s) IV Push daily  busPIRone 5 milliGRAM(s) Oral two times a day  calcitriol   Capsule 0.25 MICROGram(s) Oral daily  dextrose 5% + lactated ringers. 1000 milliLiter(s) (75 mL/Hr) IV Continuous <Continuous>  digoxin  Injectable 125 MICROGram(s) IV Push daily  fludroCORTISONE 0.1 milliGRAM(s) Oral daily  fluPHENAZine  Injectable 2.5 milliGRAM(s) IntraMuscular two times a day  hydrocortisone sodium succinate Injectable 100 milliGRAM(s) IV Push every 8 hours  levothyroxine Injectable 25 MICROGram(s) IV Push at bedtime  midodrine. 10 milliGRAM(s) Oral three times a day  pancrelipase Oral Capsule (CREON 12,000 Lipase Units) - Peds 2 Capsule(s) Oral three times a day with meals  pantoprazole  Injectable 40 milliGRAM(s) IV Push two times a day  piperacillin/tazobactam IVPB.. 3.375 Gram(s) IV Intermittent every 8 hours    MEDICATIONS  (PRN):  acetaminophen     Tablet .. 650 milliGRAM(s) Oral every 6 hours PRN Temp greater or equal to 38C (100.4F), Mild Pain (1 - 3)  aluminum hydroxide/magnesium hydroxide/simethicone Suspension 30 milliLiter(s) Oral every 4 hours PRN Dyspepsia  melatonin 3 milliGRAM(s) Oral at bedtime PRN Insomnia  ondansetron Injectable 4 milliGRAM(s) IV Push every 8 hours PRN Nausea and/or Vomiting

## 2022-08-08 NOTE — DIETITIAN INITIAL EVALUATION ADULT - FACTORS AFF FOOD INTAKE
Patient s/p hernia reduction at bedside ,diet advanced to clear fluids this morning , tolerated well . c/o hunger asking for solid food/other (specify)

## 2022-08-08 NOTE — PROGRESS NOTE ADULT - ASSESSMENT
Impression/Plan:  CINTHYA GUZMAN is a 63yo man with SBO caused by an incarcerated right inguinal hernia.    #SBO 2/to RIGHT INGUINAL HERNIA  - Hernia reduced at bedside.   - Serial abdominal examinations.   - Advance to solid diet with protein supplements.   - Recommended to patient that he consider inguinal hernia repair in the near future.   - May need to wait until excoriations on right groin from ileostomy output irritation heals.   - Would recommend wound care consultation for counseling on good ostomy appliance application.   - Consideration of rehab facility with good ostomy care capabilities.   - Would appreciate medical optimization in anticipation of surgery.      --------------------------------------------------------    Subjective:  Continues to have good output from ileostomy. No nausea or vomiting. No pain.     Objective:  T(C): 36.8, Max: 36.9 (08-07-22 @ 15:00)  HR: 74 (72 - 105)  BP: 104/66 (73/53 - 120/68)  RR: 12 (12 - 17)  SpO2: 100% (79% - 100%)    Physical Exam  ABD soft  No tenderness.   RLQ ileostomy with gas and liquid stool. Slight prolapse.     Laboratory Data            9.1                 x     | x    | x      6.78  )------( 273       --------------------<x                  27.2                x     | x    | x        TP    x    | ALB x         CA x             PT x        ----------------------  AST x    | ALT x         MAG x          PTT x                ----------------------  TBIL x    | DBIL x         PHOS x         INR x       ----------------------  ALP x     Imaging Data  CT ABD/PEL 8/6/22:  SBO with transition at right inguinal hernia.

## 2022-08-08 NOTE — DIETITIAN INITIAL EVALUATION ADULT - ORAL INTAKE PTA/DIET HISTORY
has per transfer info patient was consuming a Low fiber diet with thin liquids & Ensure Clear BID( prefers juice like supplement)  , appetite / po intake was  reported good PTA as per patient . NKFA no issue chewing swallowing reported .

## 2022-08-08 NOTE — DIETITIAN INITIAL EVALUATION ADULT - OTHER INFO
62 y o m with h/o schizophrenia, htn, Afib, ileostomy, hypothyroidism, chronic hyponatremia hypotension on midodrine and florinef at [facility], pancreatic insufficiency, HLD, BIB EMS from [facility] for c/o generalized abd pain, 8/10, associated with  multiple episodes of n/v PTA , Patient noted with SBO due to incarcerated hernia , s/p reduction at bedside, Skin intact , Ileostomy noted (+2) arm edema . NFPE conducted , mild muscle wasting observed & los of body fat observed. & > 10% weight loss over 6 months, indicating moderate protein calorie malnutrition . Patient reports gradual weight loss since previous hospitalization in February 2022.  Patient was receiving Ensure Clear BID PTA

## 2022-08-08 NOTE — PROGRESS NOTE ADULT - SUBJECTIVE AND OBJECTIVE BOX
Follow-up Critical Care Progress Note  Chief Complaint : Intestinal obstruction          No new events overnight.  Denies SOB/CP.       Allergies :No Known Allergies      PAST MEDICAL & SURGICAL HISTORY:  Atrial fibrillation    Heart failure    History of hypotension    Generalized anxiety disorder    Type 2 diabetes mellitus    Presence of ileostomy    S/P cholecystectomy        Medications:  MEDICATIONS  (STANDING):  benztropine Injectable 2 milliGRAM(s) IV Push daily  busPIRone 5 milliGRAM(s) Oral two times a day  calcitriol   Capsule 0.25 MICROGram(s) Oral daily  dextrose 5% + lactated ringers. 1000 milliLiter(s) (75 mL/Hr) IV Continuous <Continuous>  digoxin  Injectable 125 MICROGram(s) IV Push daily  fludroCORTISONE 0.1 milliGRAM(s) Oral daily  fluPHENAZine  Injectable 2.5 milliGRAM(s) IntraMuscular two times a day  hydrocortisone sodium succinate Injectable 100 milliGRAM(s) IV Push every 8 hours  levothyroxine Injectable 25 MICROGram(s) IV Push at bedtime  midodrine. 10 milliGRAM(s) Oral three times a day  pancrelipase Oral Capsule (CREON 12,000 Lipase Units) - Peds 2 Capsule(s) Oral three times a day with meals  pantoprazole  Injectable 40 milliGRAM(s) IV Push two times a day  piperacillin/tazobactam IVPB.. 3.375 Gram(s) IV Intermittent every 8 hours    MEDICATIONS  (PRN):  acetaminophen     Tablet .. 650 milliGRAM(s) Oral every 6 hours PRN Temp greater or equal to 38C (100.4F), Mild Pain (1 - 3)  aluminum hydroxide/magnesium hydroxide/simethicone Suspension 30 milliLiter(s) Oral every 4 hours PRN Dyspepsia  melatonin 3 milliGRAM(s) Oral at bedtime PRN Insomnia  ondansetron Injectable 4 milliGRAM(s) IV Push every 8 hours PRN Nausea and/or Vomiting      Antibiotics History  piperacillin/tazobactam IVPB.. 3.375 Gram(s) IV Intermittent every 8 hours, 22 @ 01:13, Stop order after: 7 Days  piperacillin/tazobactam IVPB... 3.375 Gram(s) IV Intermittent once, 22 @ 21:35, Stop order after: 1 Doses  vancomycin  IVPB. 1000 milliGRAM(s) IV Intermittent once, 08-06-22 @ 21:35, Stop order after: 1 Doses      Heme Medications       GI Medications  aluminum hydroxide/magnesium hydroxide/simethicone Suspension 30 milliLiter(s) Oral every 4 hours, 22 @ 01:54, Routine PRN  pancrelipase Oral Capsule (CREON 12,000 Lipase Units) - Peds 2 Capsule(s) Oral three times a day with meals, 22 @ 04:00, Routine  pantoprazole  Injectable 40 milliGRAM(s) IV Push two times a day, 22 @ 01:01, Now      COVID  22 @ 19:30  COVID -   NotDetec     WBC Trend  22 @ 10:10   -  6.78  22 @ 06:10   -  7.34  22 @ 19:30   -  18.10<H>    H/H Trend  22 @ 10:10   -   9.1<L>/ 27.2<L>  22 @ 06:10   -   8.5<L>/ 25.6<L>  22 @ 19:30   -   13.7/ 41.0    Stool Occult Blood    Platelet Trend  22 @ 10:10   -  273  22 @ 06:10   -  332  22 @ 19:30   -  512<H>    Trend Sodium  22 @ 06:10   -  129<L>  22 @ 02:00   -  127<L>  22 @ 20:05   -  126<L>    Trend Potassium  22 @ 06:10   -  4.2  22 @ 02:00   -  5.5<H>  22 @ 20:05   -  6.1<H>    Trend Bun/Cr  22 @ 06:10  BUN/CR -  45<H> / 1.05  22 @ 02:00  BUN/CR -  63<H> / 1.45<H>  22 @ 20:05  BUN/CR -  61<H> / 1.32<H>    Lactic Acid Trend  22 @ 06:10   -   0.6<L>  22 @ 12:05   -   2.4<H>  22 @ 02:00   -   4.2<HH>  22 @ 20:05   -   5.1<HH>    ABG Trend    Trend AST/ALT/ALK Phos/Bili  22 @ 06:10   23/36/46/0.4  22 @ 20:05   47<H>/69<H>/78/0.2      Ammonia Trend      Amylase / Lipase Trend  22 @ 19:30   -   -- / 88      Albumin Trend  22 @ 06:10   -   1.6<L>  22 @ 20:05   -   1.3<L>      PTT - PT - INR Trend  22 @ 19:30   -   18.6<L> - 11.2 - 0.97    Glucose Trend  22 @ 06:10   -  -- 84 --  22 @ 05:50   -  -- -- 76  22 @ 02:00   -  -- 129<H> --  22 @ 00:07   -  -- -- 151<H>  22 @ 20:05   -  -- 169<H> --        LABS:                        9.1    6.78  )-----------( 273      ( 08 Aug 2022 10:10 )             27.2     -08    129<L>  |  99  |  45<H>  ----------------------------<  84  4.2   |  21<L>  |  1.05    Ca    6.9<L>      08 Aug 2022 06:10  Phos  4.5     -  Mg     1.1     -    TPro  3.7<L>  /  Alb  1.6<L>  /  TBili  0.4  /  DBili  x   /  AST  23  /  ALT  36  /  AlkPhos  46  08-08  PT/INR - ( 06 Aug 2022 19:30 )   PT: 11.2 sec;   INR: 0.97 ratio    PTT - ( 06 Aug 2022 19:30 )  PTT:18.6 sec       Urinalysis Basic - ( 07 Aug 2022 07:15 )    Color: Yellow / Appearance: Clear / S.015 / pH: x  Gluc: x / Ketone: Negative  / Bili: Negative / Urobili: Negative   Blood: x / Protein: Negative / Nitrite: Negative   Leuk Esterase: Negative / RBC: x / WBC x   Sq Epi: x / Non Sq Epi: x / Bacteria: x              CULTURES: (if applicable)    Culture - Urine (collected 22 @ 07:00)  Source: Clean Catch Clean Catch (Midstream)  Final Report (22 07:28):    No growth    Culture - Blood (collected 22 @ 21:20)  Source: .Blood Blood-Peripheral  Preliminary Report (22:):    No growth to date.    Culture - Blood (collected 22 @ 21:20)  Source: .Blood Blood-Peripheral  Preliminary Report (22:):    No growth to date.           VITALS:  T(C): 36.8 (22 @ 07:00), Max: 36.9 (22 @ 15:00)  T(F): 98.2 (22 07:00), Max: 98.5 (22 @ 15:00)  HR: 74 (22 @ 10:00) (72 - 100)  BP: 104/66 (22 @ 10:00) (73/53 - 120/68)  BP(mean): 77 (22 @ 10:00) (60 - 87)  ABP: --  ABP(mean): --  RR: 12 (22 @ 10:00) (12 - 17)  SpO2: 100% (22 10:00) (79% - 100%)  CVP(mm Hg): --  CVP(cm H2O): --    Ins and Outs     22 @ 07:01  -  22 07:00  --------------------------------------------------------  IN: 3165 mL / OUT: 3250 mL / NET: -85 mL        Height (cm): 188 (22 11:02)  Weight (kg): 70.3 (08-07-22 @ 11:02)  BMI (kg/m2): 19.9 (22 @ 11:02)        I&O's Detail    07 Aug 2022 07:01  -  08 Aug 2022 07:00  --------------------------------------------------------  IN:    IV PiggyBack: 200 mL    IV PiggyBack: 500 mL    Lactated Ringers: 1875 mL    Oral Fluid: 590 mL  Total IN: 3165 mL    OUT:    Colostomy (mL): 2000 mL    Voided (mL): 1250 mL  Total OUT: 3250 mL    Total NET: -85 mL

## 2022-08-09 DIAGNOSIS — K56.609 UNSPECIFIED INTESTINAL OBSTRUCTION, UNSPECIFIED AS TO PARTIAL VERSUS COMPLETE OBSTRUCTION: ICD-10-CM

## 2022-08-09 LAB
ANION GAP SERPL CALC-SCNC: 8 MMOL/L — SIGNIFICANT CHANGE UP (ref 5–17)
BUN SERPL-MCNC: 21 MG/DL — SIGNIFICANT CHANGE UP (ref 7–23)
CALCIUM SERPL-MCNC: 6.8 MG/DL — LOW (ref 8.4–10.5)
CHLORIDE SERPL-SCNC: 103 MMOL/L — SIGNIFICANT CHANGE UP (ref 96–108)
CO2 SERPL-SCNC: 23 MMOL/L — SIGNIFICANT CHANGE UP (ref 22–31)
CREAT SERPL-MCNC: 0.6 MG/DL — SIGNIFICANT CHANGE UP (ref 0.5–1.3)
EGFR: 109 ML/MIN/1.73M2 — SIGNIFICANT CHANGE UP
GLUCOSE SERPL-MCNC: 93 MG/DL — SIGNIFICANT CHANGE UP (ref 70–99)
HCT VFR BLD CALC: 28.2 % — LOW (ref 39–50)
HGB BLD-MCNC: 9.6 G/DL — LOW (ref 13–17)
MCHC RBC-ENTMCNC: 29.6 PG — SIGNIFICANT CHANGE UP (ref 27–34)
MCHC RBC-ENTMCNC: 34 GM/DL — SIGNIFICANT CHANGE UP (ref 32–36)
MCV RBC AUTO: 87 FL — SIGNIFICANT CHANGE UP (ref 80–100)
NRBC # BLD: 0 /100 WBCS — SIGNIFICANT CHANGE UP (ref 0–0)
PLATELET # BLD AUTO: 332 K/UL — SIGNIFICANT CHANGE UP (ref 150–400)
POTASSIUM SERPL-MCNC: 3.2 MMOL/L — LOW (ref 3.5–5.3)
POTASSIUM SERPL-SCNC: 3.2 MMOL/L — LOW (ref 3.5–5.3)
RBC # BLD: 3.24 M/UL — LOW (ref 4.2–5.8)
RBC # FLD: 15.9 % — HIGH (ref 10.3–14.5)
SODIUM SERPL-SCNC: 134 MMOL/L — LOW (ref 135–145)
WBC # BLD: 6.63 K/UL — SIGNIFICANT CHANGE UP (ref 3.8–10.5)
WBC # FLD AUTO: 6.63 K/UL — SIGNIFICANT CHANGE UP (ref 3.8–10.5)

## 2022-08-09 PROCEDURE — 99231 SBSQ HOSP IP/OBS SF/LOW 25: CPT

## 2022-08-09 PROCEDURE — 99233 SBSQ HOSP IP/OBS HIGH 50: CPT | Mod: FS

## 2022-08-09 RX ORDER — NYSTATIN CREAM 100000 [USP'U]/G
1 CREAM TOPICAL
Refills: 0 | Status: DISCONTINUED | OUTPATIENT
Start: 2022-08-09 | End: 2022-08-09

## 2022-08-09 RX ORDER — POTASSIUM CHLORIDE 20 MEQ
40 PACKET (EA) ORAL ONCE
Refills: 0 | Status: COMPLETED | OUTPATIENT
Start: 2022-08-09 | End: 2022-08-09

## 2022-08-09 RX ORDER — HYDROCORTISONE 20 MG
50 TABLET ORAL EVERY 8 HOURS
Refills: 0 | Status: COMPLETED | OUTPATIENT
Start: 2022-08-09 | End: 2022-08-10

## 2022-08-09 RX ORDER — HYDROCORTISONE 20 MG
TABLET ORAL
Refills: 0 | Status: DISCONTINUED | OUTPATIENT
Start: 2022-08-09 | End: 2022-08-10

## 2022-08-09 RX ORDER — ATORVASTATIN CALCIUM 80 MG/1
20 TABLET, FILM COATED ORAL AT BEDTIME
Refills: 0 | Status: DISCONTINUED | OUTPATIENT
Start: 2022-08-09 | End: 2022-08-10

## 2022-08-09 RX ORDER — APIXABAN 2.5 MG/1
2.5 TABLET, FILM COATED ORAL EVERY 12 HOURS
Refills: 0 | Status: DISCONTINUED | OUTPATIENT
Start: 2022-08-09 | End: 2022-08-10

## 2022-08-09 RX ADMIN — MIDODRINE HYDROCHLORIDE 10 MILLIGRAM(S): 2.5 TABLET ORAL at 05:39

## 2022-08-09 RX ADMIN — Medication 5 MILLIGRAM(S): at 05:38

## 2022-08-09 RX ADMIN — Medication 2 CAPSULE(S): at 11:55

## 2022-08-09 RX ADMIN — Medication 2 CAPSULE(S): at 08:11

## 2022-08-09 RX ADMIN — Medication 100 MILLIGRAM(S): at 05:38

## 2022-08-09 RX ADMIN — APIXABAN 2.5 MILLIGRAM(S): 2.5 TABLET, FILM COATED ORAL at 17:45

## 2022-08-09 RX ADMIN — PANTOPRAZOLE SODIUM 40 MILLIGRAM(S): 20 TABLET, DELAYED RELEASE ORAL at 17:45

## 2022-08-09 RX ADMIN — MIDODRINE HYDROCHLORIDE 10 MILLIGRAM(S): 2.5 TABLET ORAL at 11:55

## 2022-08-09 RX ADMIN — Medication 125 MICROGRAM(S): at 05:39

## 2022-08-09 RX ADMIN — Medication 50 MILLIGRAM(S): at 21:30

## 2022-08-09 RX ADMIN — PANTOPRAZOLE SODIUM 40 MILLIGRAM(S): 20 TABLET, DELAYED RELEASE ORAL at 05:39

## 2022-08-09 RX ADMIN — Medication 40 MILLIEQUIVALENT(S): at 11:55

## 2022-08-09 RX ADMIN — Medication 2 MILLIGRAM(S): at 11:55

## 2022-08-09 RX ADMIN — FLUPHENAZINE HYDROCHLORIDE 5 MILLIGRAM(S): 1 TABLET, FILM COATED ORAL at 17:44

## 2022-08-09 RX ADMIN — Medication 5 MILLIGRAM(S): at 17:45

## 2022-08-09 RX ADMIN — ATORVASTATIN CALCIUM 20 MILLIGRAM(S): 80 TABLET, FILM COATED ORAL at 21:30

## 2022-08-09 RX ADMIN — FLUPHENAZINE HYDROCHLORIDE 5 MILLIGRAM(S): 1 TABLET, FILM COATED ORAL at 05:38

## 2022-08-09 RX ADMIN — FLUDROCORTISONE ACETATE 0.1 MILLIGRAM(S): 0.1 TABLET ORAL at 05:39

## 2022-08-09 RX ADMIN — Medication 2 CAPSULE(S): at 17:44

## 2022-08-09 RX ADMIN — CALCITRIOL 0.25 MICROGRAM(S): 0.5 CAPSULE ORAL at 11:54

## 2022-08-09 RX ADMIN — Medication 100 MICROGRAM(S): at 06:03

## 2022-08-09 RX ADMIN — MIDODRINE HYDROCHLORIDE 10 MILLIGRAM(S): 2.5 TABLET ORAL at 17:45

## 2022-08-09 NOTE — PROGRESS NOTE ADULT - ASSESSMENT
62 yom pmh  h/o schizophrenia, htn, Afib, ileostomy, hypothyroidism, chronic hyponatremia hypotension on midodrine and florinef at [facility], pancreatic insufficiency, HLD, BIB EMS from [facility] for c/o generalized abd pain, patient admitted for severe sepsis+, hyperkalemia, hyponatremia, hypotension, pt currently stable, no concern for hernia stragulation.

## 2022-08-09 NOTE — PHARMACOTHERAPY INTERVENTION NOTE - COMMENTS
Patient is currently ordered for Eliquis 2.5 mg BID (home dose) for hx of afib. Discussed dosing with Dr. Boyer and informed MD that patient does not meet the 2/3 criteria to be renally adjusted. MD aware and would like to continue with home dose. MD will follow up with dosing.

## 2022-08-09 NOTE — PROGRESS NOTE ADULT - PROBLEM SELECTOR PLAN 1
resolving. illeostomy functioning    advance diet as tolerated  OOB/PT  DVT/PE PROPHYLAXIS  elective right inguinal hernia repair

## 2022-08-09 NOTE — PROGRESS NOTE ADULT - ASSESSMENT
Physical Examination:  GENERAL:               Alert, Oriented, No acute distress.    HEENT:                    Pupils equal, reactive to light.  Symmetric. No JVD, Moist MM  PULM:                     Bilateral air entry, Clear to auscultation bilaterally, no significant sputum production, No Rales, No Rhonchi, No Wheezing  CVS:                         S1, S2,  No Murmur  ABD:                        Soft, nondistended, nontender, normoactive bowel sounds,   EXT:                         No edema, nontender, No Cyanosis or Clubbing   NEURO:                  Alert, Confused, interactive, nonfocal, follows commands  PSYC:                      Calm, Limited Insight.        Assessment  1. Incarcerated vs Strangulated right inguinal hernia s/p manual reduction/decompression  2. SBO related to above - Resolved  3. Lactic acidosis due to above - Resolved  4. PMHx Schizophrenia, Hyponatremia, Hypothyroidism , DM2         Plan  continue care on medical floor  solucortef taper order placed    - Continue Midodrine 10mg TID and Florinef 0.1 QD, low threshold for vasopressors should lactate and hypotension worsen  - Monitor Ileostomy output  - Trend labs and supplement lytes, including lactate  - Monitor I&Os, avoid nephrotoxins, renal dose meds  - Surgical follow-up for continued management of SBO/inguinal hernia/ileostomy  - Continue empiric Zosyn  - Continue home meds unless contraindicated  - Advanced to clear liquid diet prior to ICU transfer, continue  - PPX: Protonix, no AC  - GOC: Full CODE  - Noted anemia suspect from fluid resuscitation       d/w Dr. Boyer

## 2022-08-09 NOTE — PROGRESS NOTE ADULT - SUBJECTIVE AND OBJECTIVE BOX
62 yom #SBO 2/to RIGHT INGUINAL HERNIA, now reduced, pt comfortable in bed, denies abd pain, N, V, having bowel function via illeostomy.     PAST MEDICAL & SURGICAL HISTORY:  Atrial fibrillation      Heart failure      History of hypotension      Generalized anxiety disorder      Type 2 diabetes mellitus      Presence of ileostomy      S/P cholecystectomy    Vital Signs Last 24 Hrs  T(C): 36.4 (09 Aug 2022 05:37), Max: 36.5 (08 Aug 2022 18:35)  T(F): 97.5 (09 Aug 2022 05:37), Max: 97.7 (08 Aug 2022 18:35)  HR: 86 (09 Aug 2022 05:37) (77 - 103)  BP: 100/59 (09 Aug 2022 05:37) (90/57 - 121/86)  BP(mean): 96 (08 Aug 2022 17:00) (65 - 96)  RR: 16 (09 Aug 2022 05:37) (13 - 20)  SpO2: 99% (09 Aug 2022 05:37) (86% - 100%)    Parameters below as of 09 Aug 2022 05:37  Patient On (Oxygen Delivery Method): room air  Gen: A&O X3   abd: soft, nt, nd, no bulge noted on Right groin. excoriation on right abd, improving   LE: soft, no swelling or tenderness

## 2022-08-09 NOTE — ADVANCED PRACTICE NURSE CONSULT - ASSESSMENT
Patient with Illeostomy admitted with erythema & excoriation in abdominal folds.  Patient A&Ox4, reports skin irritation from time to time when illeostomy leaks, states it is currently resolving..   Slight erythema & excoriation of abdominal fold noted.  No signs of fungal involvement.   Ostomy appliance intact at this time, no sign of leakage.  Moderate loose stool output.  Stoma very large, beefy red in color.    Of note, patient states he has a chronic wound on the sacrum; that it used to be 'very large'.  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  Upon assessment, a very small full thickness wound is found over the coccyx.  Wound measures 1 x 1 x 0.5 cm, with undermining at 9:00 of 1.5 cm; and a tunnel at 11:00, 3 cm deep.  Wound bed is not visible given the small opening.  Periwound with some maceration to about 1 cm around.  There is some concern for opening of wound closing before tunneling has filled in. Patient with Illeostomy admitted with erythema & excoriation in abdominal folds.  Patient A&Ox4, reports skin irritation from time to time when illeostomy leaks, states it is currently resolving..   Slight erythema & excoriation of abdominal fold noted.  No signs of fungal involvement.   Ostomy appliance intact at this time, no sign of leakage.  Moderate loose stool output.  Stoma very large, beefy red in color.    Of note, patient states he has a chronic wound on the sacrum; that it used to be 'very large'.  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  Upon assessment, a very small full thickness wound is found over the coccyx.  Wound measures 1 x 1 x 0.5 cm, with undermining at 9:00 of 1.5 cm; and a tunnel at 11:00, 3 cm deep.  Wound bed is not visible given the small opening. No drainage noted.  Periwound with some maceration to about 1 cm around.  There is some concern for opening of wound closing before tunneling has filled in.

## 2022-08-09 NOTE — PROGRESS NOTE ADULT - SUBJECTIVE AND OBJECTIVE BOX
MEDICATIONS  (STANDING):  benztropine 2 milliGRAM(s) Oral daily  busPIRone 5 milliGRAM(s) Oral two times a day  calcitriol   Capsule 0.25 MICROGram(s) Oral daily  dextrose 5% + lactated ringers. 1000 milliLiter(s) (75 mL/Hr) IV Continuous <Continuous>  digoxin     Tablet 125 MICROGram(s) Oral daily  fludroCORTISONE 0.1 milliGRAM(s) Oral daily  fluPHENAZine 5 milliGRAM(s) Oral two times a day  levothyroxine 100 MICROGram(s) Oral daily  midodrine. 10 milliGRAM(s) Oral three times a day  pancrelipase Oral Capsule (CREON 12,000 Lipase Units) - Peds 2 Capsule(s) Oral three times a day with meals  pantoprazole    Tablet 40 milliGRAM(s) Oral two times a day    MEDICATIONS  (PRN):  acetaminophen     Tablet .. 650 milliGRAM(s) Oral every 6 hours PRN Temp greater or equal to 38C (100.4F), Mild Pain (1 - 3)  aluminum hydroxide/magnesium hydroxide/simethicone Suspension 30 milliLiter(s) Oral every 4 hours PRN Dyspepsia  melatonin 3 milliGRAM(s) Oral at bedtime PRN Insomnia  ondansetron Injectable 4 milliGRAM(s) IV Push every 8 hours PRN Nausea and/or Vomiting         MEDICATIONS  (STANDING):    calcitriol   Capsule 0.25 MICROGram(s) Oral daily    digoxin     Tablet 125 MICROGram(s) Oral daily      pancrelipase Oral Capsule (CREON 12,000 Lipase Units) - Peds 2 Capsule(s) Oral three times a day with meals  pantoprazole    Tablet 40 milliGRAM(s) Oral two times a day       Patient is a 62y old  Male who presents with a chief complaint of Hypotension (08 Aug 2022 11:16)    Patient seen and examined at bedside. No overnight events reported. Patient is resting in bed, no complaints at this time, good output from colostomy     ALLERGIES:  No Known Allergies    MEDICATIONS  (STANDING):  benztropine 2 milliGRAM(s) Oral daily  busPIRone 5 milliGRAM(s) Oral two times a day  calcitriol   Capsule 0.25 MICROGram(s) Oral daily  dextrose 5% + lactated ringers. 1000 milliLiter(s) (75 mL/Hr) IV Continuous <Continuous>  digoxin     Tablet 125 MICROGram(s) Oral daily  fludroCORTISONE 0.1 milliGRAM(s) Oral daily  fluPHENAZine 5 milliGRAM(s) Oral two times a day  hydrocortisone sodium succinate Injectable 100 milliGRAM(s) IV Push every 8 hours  levothyroxine 100 MICROGram(s) Oral daily  midodrine. 10 milliGRAM(s) Oral three times a day  pancrelipase Oral Capsule (CREON 12,000 Lipase Units) - Peds 2 Capsule(s) Oral three times a day with meals  pantoprazole    Tablet 40 milliGRAM(s) Oral two times a day    MEDICATIONS  (PRN):  acetaminophen     Tablet .. 650 milliGRAM(s) Oral every 6 hours PRN Temp greater or equal to 38C (100.4F), Mild Pain (1 - 3)  aluminum hydroxide/magnesium hydroxide/simethicone Suspension 30 milliLiter(s) Oral every 4 hours PRN Dyspepsia  melatonin 3 milliGRAM(s) Oral at bedtime PRN Insomnia  ondansetron Injectable 4 milliGRAM(s) IV Push every 8 hours PRN Nausea and/or Vomiting    Vital Signs Last 24 Hrs  T(F): 97.5 (09 Aug 2022 05:37), Max: 98 (08 Aug 2022 11:00)  HR: 86 (09 Aug 2022 05:37) (67 - 103)  BP: 100/59 (09 Aug 2022 05:37) (90/57 - 121/86)  RR: 16 (09 Aug 2022 05:37) (12 - 20)  SpO2: 99% (09 Aug 2022 05:37) (86% - 100%)    I&O's Summary  08 Aug 2022 07:01  -  09 Aug 2022 07:00  --------------------------------------------------------  IN: 900 mL / OUT: 3900 mL / NET: -3000 mL    PHYSICAL EXAM:  General: NAD, A/O x 3  ENT: No gross hearing impairment, Moist mucous membranes, no thrush  Neck: Supple, No JVD  Lungs: Clear to auscultation bilaterally, good air entry, non-labored breathing  Cardio: RRR, S1/S2, No murmur  Abdomen: Soft, Nontender, Nondistended; Bowel sounds present. Colostomy with red stoma, liquid output. Noted mild erythema to skin fold along entire lower abdomen   Extremities: No calf tenderness, No cyanosis, No pitting edema  Psych: Appropriate mood and affect    LABS:                        9.6    6.63  )-----------( 332      ( 09 Aug 2022 08:22 )             28.2         134  |  103  |  21  ----------------------------<  93  3.2   |  23  |  0.60    Ca    6.8      09 Aug 2022 08:22  Phos  4.5       Mg     1.1         TPro  3.7  /  Alb  1.6  /  TBili  0.4  /  DBili  x   /  AST  23  /  ALT  36  /  AlkPhos  46  -      Lipase, Serum: 88 U/L (22 @ 19:30)      PT/INR - ( 06 Aug 2022 19:30 )   PT: 11.2 sec;   INR: 0.97 ratio         PTT - ( 06 Aug 2022 19:30 )  PTT:18.6 sec  Lactate, Blood: 0.6 mmol/L ( @ 06:10)  Lactate, Blood: 2.4 mmol/L ( @ 12:05)  Lactate, Blood: 4.2 mmol/L ( @ 02:00)  Lactate, Blood: 5.1 mmol/L ( @ 20:05)    Urinalysis Basic - ( 07 Aug 2022 07:15 )    Color: Yellow / Appearance: Clear / S.015 / pH: x  Gluc: x / Ketone: Negative  / Bili: Negative / Urobili: Negative   Blood: x / Protein: Negative / Nitrite: Negative   Leuk Esterase: Negative / RBC: x / WBC x   Sq Epi: x / Non Sq Epi: x / Bacteria: x    Culture - Urine (collected 07 Aug 2022 07:00)  Source: Clean Catch Clean Catch (Midstream)  Final Report (08 Aug 2022 07:28):    No growth    Culture - Blood (collected 06 Aug 2022 21:20)  Source: .Blood Blood-Peripheral  Preliminary Report (08 Aug 2022 06:01):    No growth to date.    Culture - Blood (collected 06 Aug 2022 21:20)  Source: .Blood Blood-Peripheral  Preliminary Report (08 Aug 2022 06:01):    No growth to date.    COVID-19 PCR: NotDetec (22 @ 19:30)    RADIOLOGY & ADDITIONAL TESTS:    Care Discussed with Consultants/Other Providers:

## 2022-08-09 NOTE — PROGRESS NOTE ADULT - SUBJECTIVE AND OBJECTIVE BOX
Follow-up Critical Care Progress Note  Chief Complaint : Intestinal obstruction          No new events overnight.  Denies SOB/CP.       Allergies :No Known Allergies      PAST MEDICAL & SURGICAL HISTORY:  Atrial fibrillation    Heart failure    History of hypotension    Generalized anxiety disorder    Type 2 diabetes mellitus    Presence of ileostomy    S/P cholecystectomy        Medications:  MEDICATIONS  (STANDING):  apixaban 2.5 milliGRAM(s) Oral every 12 hours  atorvastatin 20 milliGRAM(s) Oral at bedtime  benztropine 2 milliGRAM(s) Oral daily  busPIRone 5 milliGRAM(s) Oral two times a day  calcitriol   Capsule 0.25 MICROGram(s) Oral daily  dextrose 5% + lactated ringers. 1000 milliLiter(s) (75 mL/Hr) IV Continuous <Continuous>  digoxin     Tablet 125 MICROGram(s) Oral daily  fludroCORTISONE 0.1 milliGRAM(s) Oral daily  fluPHENAZine 5 milliGRAM(s) Oral two times a day  hydrocortisone sodium succinate Injectable 100 milliGRAM(s) IV Push every 8 hours  levothyroxine 100 MICROGram(s) Oral daily  midodrine. 10 milliGRAM(s) Oral three times a day  pancrelipase Oral Capsule (CREON 12,000 Lipase Units) - Peds 2 Capsule(s) Oral three times a day with meals  pantoprazole    Tablet 40 milliGRAM(s) Oral two times a day    MEDICATIONS  (PRN):  acetaminophen     Tablet .. 650 milliGRAM(s) Oral every 6 hours PRN Temp greater or equal to 38C (100.4F), Mild Pain (1 - 3)  aluminum hydroxide/magnesium hydroxide/simethicone Suspension 30 milliLiter(s) Oral every 4 hours PRN Dyspepsia  melatonin 3 milliGRAM(s) Oral at bedtime PRN Insomnia  ondansetron Injectable 4 milliGRAM(s) IV Push every 8 hours PRN Nausea and/or Vomiting      Antibiotics History  piperacillin/tazobactam IVPB.. 3.375 Gram(s) IV Intermittent every 8 hours, 08-07-22 @ 01:13, Stop order after: 7 Days  piperacillin/tazobactam IVPB... 3.375 Gram(s) IV Intermittent once, 08-06-22 @ 21:35, Stop order after: 1 Doses  vancomycin  IVPB. 1000 milliGRAM(s) IV Intermittent once, 08-06-22 @ 21:35, Stop order after: 1 Doses      Heme Medications   apixaban 2.5 milliGRAM(s) Oral every 12 hours, 08-09-22 @ 18:00      GI Medications  aluminum hydroxide/magnesium hydroxide/simethicone Suspension 30 milliLiter(s) Oral every 4 hours, 08-07-22 @ 01:54, Routine PRN  pancrelipase Oral Capsule (CREON 12,000 Lipase Units) - Peds 2 Capsule(s) Oral three times a day with meals, 08-07-22 @ 04:00, Routine  pantoprazole    Tablet 40 milliGRAM(s) Oral two times a day, 08-08-22 @ 15:36, Routine      COVID  08-06-22 @ 19:30  COVID -   NotDetec       WBC Trend  08-09-22 @ 08:22   -  6.63  08-08-22 @ 10:10   -  6.78  08-08-22 @ 06:10   -  7.34  08-06-22 @ 19:30   -  18.10<H>    H/H Trend  08-09-22 @ 08:22   -   9.6<L>/ 28.2<L>  08-08-22 @ 10:10   -   9.1<L>/ 27.2<L>  08-08-22 @ 06:10   -   8.5<L>/ 25.6<L>  08-06-22 @ 19:30   -   13.7/ 41.0    Stool Occult Blood    Platelet Trend  08-09-22 @ 08:22   -  332  08-08-22 @ 10:10   -  273  08-08-22 @ 06:10   -  332  08-06-22 @ 19:30   -  512<H>    Trend Sodium  08-09-22 @ 08:22   -  134<L>  08-08-22 @ 06:10   -  129<L>  08-07-22 @ 02:00   -  127<L>  08-06-22 @ 20:05   -  126<L>    Trend Potassium  08-09-22 @ 08:22   -  3.2<L>  08-08-22 @ 06:10   -  4.2  08-07-22 @ 02:00   -  5.5<H>  08-06-22 @ 20:05   -  6.1<H>    Trend Bun/Cr  08-09-22 @ 08:22  BUN/CR -  21 / 0.60  08-08-22 @ 06:10  BUN/CR -  45<H> / 1.05  08-07-22 @ 02:00  BUN/CR -  63<H> / 1.45<H>  08-06-22 @ 20:05  BUN/CR -  61<H> / 1.32<H>    Lactic Acid Trend  08-08-22 @ 06:10   -   0.6<L>  08-07-22 @ 12:05   -   2.4<H>  08-07-22 @ 02:00   -   4.2<HH>  08-06-22 @ 20:05   -   5.1<HH>     Trend AST/ALT/ALK Phos/Bili  08-08-22 @ 06:10   23/36/46/0.4  08-06-22 @ 20:05   47<H>/69<H>/78/0.2       Amylase / Lipase Trend  08-06-22 @ 19:30   -   -- / 88      Albumin Trend  08-08-22 @ 06:10   -   1.6<L>  08-06-22 @ 20:05   -   1.3<L>      PTT - PT - INR Trend  08-06-22 @ 19:30   -   18.6<L> - 11.2 - 0.97    Glucose Trend  08-09-22 @ 08:22   -  -- 93 --  08-08-22 @ 06:10   -  -- 84 --  08-08-22 @ 05:50   -  -- -- 76  08-07-22 @ 02:00   -  -- 129<H> --  08-07-22 @ 00:07   -  -- -- 151<H>  08-06-22 @ 20:05   -  -- 169<H> --        LABS:                        9.6    6.63  )-----------( 332      ( 09 Aug 2022 08:22 )             28.2     08-09    134<L>  |  103  |  21  ----------------------------<  93  3.2<L>   |  23  |  0.60    Ca    6.8<L>      09 Aug 2022 08:22  Phos  4.5     08-08  Mg     1.1     08-08    TPro  3.7<L>  /  Alb  1.6<L>  /  TBili  0.4  /  DBili  x   /  AST  23  /  ALT  36  /  AlkPhos  46  08-08     CULTURES: (if applicable)    Culture - Urine (collected 08-07-22 @ 07:00)  Source: Clean Catch Clean Catch (Midstream)  Final Report (08-08-22 @ 07:28):    No growth    Culture - Blood (collected 08-06-22 @ 21:20)  Source: .Blood Blood-Peripheral  Preliminary Report (08-08-22 @ 06:01):    No growth to date.    Culture - Blood (collected 08-06-22 @ 21:20)  Source: .Blood Blood-Peripheral  Preliminary Report (08-08-22 @ 06:01):    No growth to date.        ECHO:      VITALS:  T(C): 36.6 (08-09-22 @ 12:47), Max: 36.6 (08-09-22 @ 12:47)  T(F): 97.9 (08-09-22 @ 12:47), Max: 97.9 (08-09-22 @ 12:47)  HR: 86 (08-09-22 @ 12:47) (77 - 103)  BP: 97/62 (08-09-22 @ 12:47) (85/52 - 121/86)  BP(mean): 63 (08-09-22 @ 11:52) (63 - 96)  ABP: --  ABP(mean): --  RR: 16 (08-09-22 @ 12:47) (15 - 18)  SpO2: 97% (08-09-22 @ 12:47) (97% - 100%)  CVP(mm Hg): --  CVP(cm H2O): --    Ins and Outs     08-08-22 @ 07:01  -  08-09-22 @ 07:00  --------------------------------------------------------  IN: 900 mL / OUT: 3900 mL / NET: -3000 mL        Height (cm): 188 (08-07-22 @ 11:02)  Weight (kg): 70.3 (08-07-22 @ 11:02)  BMI (kg/m2): 19.9 (08-07-22 @ 11:02)        I&O's Detail    08 Aug 2022 07:01  -  09 Aug 2022 07:00  --------------------------------------------------------  IN:    dextrose 5% + lactated ringers: 900 mL  Total IN: 900 mL    OUT:    Colostomy (mL): 1800 mL    Voided (mL): 2100 mL  Total OUT: 3900 mL    Total NET: -3000 mL

## 2022-08-09 NOTE — PROGRESS NOTE ADULT - ASSESSMENT
63yo male with PMH schizophrenia, hyponatremia, hypothyroidism, DM2, PSH total colectomy and ileostomy formation sent to ED by [facility] for abdominal pain and lack of ileostomy output.     #Incarcerated vs Strangulated right inguinal hernia s/p manual reduction/decompression  #SBO related to above - Resolved  #Lactic acidosis due to above - Resolved  - Hernia reduced at bedside in ED  - Continue serial abdominal examinations.   - Currently tolerating regular diet  - Empiric zosyn SHILPA'ed   - Surgery following - consider inguinal hernia repair in the near future, may need to wait until excoriations on right groin from ileostomy output irritation heals       #Schizophrenia, Hyponatremia,     #Hypothyroidism  - Continue synthroid     #DM2        #Hypotension   - Continue Midodrine 10mg TID and Florinef 0.1 QD  - Continue hydrocortisone IVP q8 hrs for now    #anemia  - from ivf      - Continue home meds unless contraindicated      - PPX: Protonix, no AC  - GOC: Full CODE      PMD:				                   Notified(Date):  Family Updated: 	 Marialuisa [phone]  	                                 Date:  8/8/22 msg left for call back     Sedation & Analgesia:	  Diet/Nutrition:		   Diet, DASH/TLC:   Sodium & Cholesterol Restricted  Supplement Feeding Modality:  Oral  Ensure Enlive Cans or Servings Per Day:  1       Frequency:  Three Times a day (08-08-22 @ 11:00) [Active]        GI PPx:			  pancrelipase Oral Capsule (CREON 12,000 Lipase Units) - Peds 2 Capsule(s) Oral three times a day with meals  pantoprazole  Injectable 40 milliGRAM(s) IV Push two times a day      DVT Ppx:		venodynes      61yo male with PMH schizophrenia, chronic hyponatremia, chronic hypotension, hypothyroidism, HLD, A fib on eliquis, PSH total colectomy and ileostomy formation sent to ED by [facility] term [facility] for abdominal pain and lack of ileostomy output. Noted to have SBO 2/2 right inguinal hernia     #Incarcerated vs Strangulated right inguinal hernia s/p manual reduction/decompression  #SBO related to above - Resolved  #Lactic acidosis due to above - Resolved  - Hernia reduced at bedside in ED  - Continue serial abdominal examinations.   - Currently tolerating regular diet  - Empiric zosyn SHILPA'ed   - Surgery following - consider inguinal hernia repair in the near future, may need to wait until excoriations on right groin from ileostomy output irritation heals     #Hypotension   - Unclear baseline BP, but with known Hx hypotension  - Continue Midodrine 10mg TID and Florinef 0.1 QD  - Continue hydrocortisone IVP q8 hrs for now    #Hyponatremia  - Reportedly chronic, unclear baseline  - f/u AM labs  - On sodium chloride tablets at home     #Anemia  - Suspect hemodilution from IVF, continue to monitor     #Schizophrenia  - Continue home meds: benztropine, buspirone, fluphenazine    #A fib  - Continue digoxin  - Holding Eliquis for now    #HLD   - Holding statin for now      Hyponatremia    #Hypothyroidism  - Continue synthroid     #Pancreatic insufficiency  - Continue creon     - PPX: Protonix, no AC  - GOC: Full CODE  - DVT ppx - venodynes       Dispo: Eventual back to [facility] - surgery this admission?    Sister Marialuisa [phone]      63yo male with PMH schizophrenia, chronic hyponatremia, chronic hypotension, hypothyroidism, HLD, A fib on eliquis, PSH total colectomy and ileostomy formation sent to ED by [facility] term [facility] for abdominal pain and lack of ileostomy output. Noted to have SBO 2/2 right inguinal hernia     #Incarcerated vs Strangulated right inguinal hernia s/p manual reduction/decompression  #SBO related to above - Resolved  #Lactic acidosis due to above - Resolved  - Hernia reduced at bedside in ED  - Continue serial abdominal examinations.   - Currently tolerating regular diet  - Empiric zosyn SHILPA'ed   - Surgery following - consider inguinal hernia repair in the near future, may need to wait until excoriations on right groin from ileostomy output irritation heals. Plan for outpatient follow up with Dr Stewart in 1-2 weeks     #Hypotension   - Unclear baseline BP, but with known Hx hypotension  - Continue Midodrine 10mg TID and Florinef 0.1 QD  - Continue hydrocortisone IVP q8 hrs for now    #Hyponatremia  - Reportedly chronic, unclear baseline  - Improving  - On sodium chloride tablets at home     #Hypokalemia  - Supplement, repeat in AM    #Anemia  - Suspect hemodilution from IVF, continue to monitor     #Schizophrenia  - Continue home meds: benztropine, buspirone, fluphenazine    #A fib  - Continue digoxin  - Holding Eliquis for now    #HLD   - Holding statin for now     #Hypothyroidism  - Continue synthroid     #Pancreatic insufficiency  - Continue creon     #GI PPX: Protonix, no AC\    #DVT ppx  - Venodynes  - Resume eliquis??    GOC: Full CODE    Dispo: Eventual back to [facility]    Sister Marialuisa [phone]      63yo male with PMH schizophrenia, chronic hyponatremia, chronic hypotension, hypothyroidism, HLD, A fib on eliquis, PSH total colectomy and ileostomy formation sent to ED by [facility] term [facility] for abdominal pain and lack of ileostomy output. Noted to have SBO 2/2 right inguinal hernia     #Incarcerated vs Strangulated right inguinal hernia s/p manual reduction/decompression  #SBO related to above - Resolved  #Lactic acidosis due to above - Resolved  - Hernia reduced at bedside in ED  - Continue serial abdominal examinations.   - Currently tolerating regular diet  - Empiric zosyn SHILPA'ed   - Surgery following - consider inguinal hernia repair in the near future, may need to wait until excoriations on right groin from ileostomy output irritation heals. Plan for outpatient follow up with Dr Stewart in 1-2 weeks   - f/u with wound care nurse for recs     #Hypotension   - Unclear baseline BP, but with known Hx hypotension  - Continue Midodrine 10mg TID and Florinef 0.1 QD  - Continue hydrocortisone IVP q8 hrs for now    #Hyponatremia  - Reportedly chronic, unclear baseline  - Improving  - On sodium chloride tablets at home     #Hypokalemia  - Supplement, repeat in AM    #Anemia  - Suspect hemodilution from IVF, continue to monitor     #Schizophrenia  - Continue home meds: benztropine, buspirone, fluphenazine    #A fib  - Continue digoxin  - Eliquis held on admission, will resume     #HLD   - Will resume statin     #Hypothyroidism  - Continue synthroid     #Pancreatic insufficiency  - Continue creon     #GI PPX: Protonix    #DVT ppx  - Will resume eliquis    GOC: Full CODE    Dispo: Eventual back to     Sister Marialuisa [phone]      63yo male with PMH schizophrenia, chronic hyponatremia, chronic hypotension, hypothyroidism, HLD, A fib on eliquis, PSH total colectomy and ileostomy formation sent to ED by [facility] term [facility] for abdominal pain and lack of ileostomy output. Noted to have SBO 2/2 right inguinal hernia     #Incarcerated vs Strangulated right inguinal hernia s/p manual reduction/decompression  #SBO related to above - Resolved  #Lactic acidosis due to above - Resolved  - Hernia reduced at bedside in ED  - Continue serial abdominal examinations.   - Currently tolerating regular diet  - Empiric zosyn SHILPA'ed   - Surgery following - consider inguinal hernia repair in the near future, may need to wait until excoriations on right groin from ileostomy output irritation heals. Plan for outpatient follow up with Dr Stewart in 1-2 weeks   - f/u with wound care nurse for recs     #Hypotension   - Unclear baseline BP, but with known Hx hypotension  - Continue Midodrine 10mg TID and Florinef 0.1 QD  - Continue hydrocortisone IVP q8 hrs for now    #Hyponatremia  - Reportedly chronic, unclear baseline  - Improving  - On sodium chloride tablets at home     #Hypokalemia  - Supplement, repeat in AM    #Anemia  - Suspect hemodilution from IVF, continue to monitor     #Schizophrenia  - Continue home meds: benztropine, buspirone, fluphenazine    #A fib  - Continue digoxin  - Eliquis held on admission, will resume     #HLD   - Will resume statin     #Hypothyroidism  - Continue synthroid     #Pancreatic insufficiency  - Continue creon     #GI PPX: Protonix    #DVT ppx  - Will resume eliquis    GOC: Full CODE    Dispo: Eventual back to     Sister Marialuisa [phone]      63yo male with PMH schizophrenia, chronic hyponatremia, chronic hypotension, hypothyroidism, HLD, A fib on eliquis, PSH total colectomy and ileostomy formation sent to ED by [facility] term [facility] for abdominal pain and lack of ileostomy output. Noted to have SBO 2/2 right inguinal hernia     #Incarcerated vs Strangulated right inguinal hernia s/p manual reduction/decompression  #SBO related to above - Resolved  #Lactic acidosis due to above - Resolved  - Hernia reduced at bedside in ED  - Continue serial abdominal examinations.   - Currently tolerating regular diet  - Empiric zosyn SHILPA'ed   - Surgery following - consider inguinal hernia repair in the near future, may need to wait until excoriations on right groin from ileostomy output irritation heals. Plan for outpatient follow up with Dr Stewart in 1-2 weeks   - f/u with wound care nurse for recs - though will start nystatin now    #Hypotension   - Unclear baseline BP, but with known Hx hypotension  - Continue Midodrine 10mg TID and Florinef 0.1 QD  - Continue hydrocortisone IV, will taper dose     #Hyponatremia  - Reportedly chronic, unclear baseline  - Improving  - On sodium chloride tablets at home     #Hypokalemia  - Supplement, repeat in AM    #Anemia  - Suspect hemodilution from IVF, continue to monitor     #Schizophrenia  - Continue home meds: benztropine, buspirone, fluphenazine    #A fib  - Continue digoxin  - Eliquis held on admission, will resume     #HLD   - Will resume statin     #Hypothyroidism  - Continue synthroid     #Pancreatic insufficiency  - Continue creon     #GI PPX: Protonix    #DVT ppx  - Will resume eliquis    GO: Full CODE    Dispo: Eventual back to     Sister Marialuisa [phone]      63yo male with PMH schizophrenia, chronic hyponatremia, chronic hypotension, hypothyroidism, HLD, A fib on eliquis, PSH total colectomy and ileostomy formation sent to ED by [facility] term [facility] for abdominal pain and lack of ileostomy output. Noted to have SBO 2/2 right inguinal hernia     #Incarcerated vs Strangulated right inguinal hernia s/p manual reduction/decompression  #SBO related to above - Resolved  #Lactic acidosis due to above - Resolved  - Hernia reduced at bedside in ED  - Continue serial abdominal examinations.   - Currently tolerating regular diet  - Empiric zosyn SHILPA'ed   - Surgery following - consider inguinal hernia repair in the near future, may need to wait until excoriations on right groin from ileostomy output irritation heals. Plan for outpatient follow up with Dr Stewart in 1-2 weeks   - f/u with wound care nurse for recs      #Hypotension   - Unclear baseline BP, but with known Hx hypotension  - Continue Midodrine 10mg TID and Florinef 0.1 QD  - Continue hydrocortisone IV, will taper dose     #Hyponatremia  - Reportedly chronic, unclear baseline  - Improving  - On sodium chloride tablets at home     #Hypokalemia  - Supplement, repeat in AM    #Anemia  - Suspect hemodilution from IVF, continue to monitor     #Schizophrenia  - Continue home meds: benztropine, buspirone, fluphenazine    #A fib  - Continue digoxin  - Eliquis held on admission, will resume     #HLD   - Will resume statin     #Hypothyroidism  - Continue synthroid     #Pancreatic insufficiency  - Continue creon     #GI PPX: Protonix    #DVT ppx  - Will resume eliquis    GOC: Full CODE    Dispo: Eventual back to     Sister Marialuisa [phone]      63yo male with PMH schizophrenia, chronic hyponatremia, chronic hypotension, hypothyroidism, HLD, A fib on eliquis, PSH total colectomy and ileostomy formation sent to ED by [facility] term [facility] for abdominal pain and lack of ileostomy output. Noted to have SBO 2/2 right inguinal hernia     #Incarcerated vs Strangulated right inguinal hernia s/p manual reduction/decompression  #SBO related to above - Resolved  #Lactic acidosis due to above - Resolved  - Hernia reduced at bedside in ED  - Continue serial abdominal examinations.   - Currently tolerating regular diet  - Empiric zosyn SHILPA'ed   - Surgery following - consider inguinal hernia repair in the near future, may need to wait until excoriations on right groin from ileostomy output irritation heals. Plan for outpatient follow up with Dr Stewart in 1-2 weeks   - f/u with wound care nurse for recs      #Hypotension   - Unclear baseline BP, but with known Hx hypotension  - Continue Midodrine 10mg TID and Florinef 0.1 QD  - Continue hydrocortisone IV, will taper dose     #Hyponatremia  - Reportedly chronic, unclear baseline  - Improving  - On sodium chloride tablets at home     #Hypokalemia  - Supplement, repeat in AM    #Anemia  - Suspect hemodilution from IVF, continue to monitor     #Schizophrenia  - Continue home meds: benztropine, buspirone, fluphenazine    #A fib  - Continue digoxin  - Eliquis held on admission, will resume     #HLD   - Will resume statin     #Hypothyroidism  - Continue synthroid     #Pancreatic insufficiency  - Continue creon     #GI PPX: Protonix    #DVT ppx  - Will resume eliquis    [facility]: Full CODE    Dispo: Eventual back to     8/9: Updated sister Marialuisa at [phone], all questions answered

## 2022-08-09 NOTE — PROGRESS NOTE ADULT - NS ATTEND AMEND GEN_ALL_CORE FT
ICU downgrade.   s/p decompression of R inguinal hernia. Tolerating diet. Tapering steroids per critical care.

## 2022-08-10 ENCOUNTER — TRANSCRIPTION ENCOUNTER (OUTPATIENT)
Age: 62
End: 2022-08-10

## 2022-08-10 VITALS — DIASTOLIC BLOOD PRESSURE: 52 MMHG | SYSTOLIC BLOOD PRESSURE: 100 MMHG

## 2022-08-10 LAB
ANION GAP SERPL CALC-SCNC: 6 MMOL/L — SIGNIFICANT CHANGE UP (ref 5–17)
BUN SERPL-MCNC: 17 MG/DL — SIGNIFICANT CHANGE UP (ref 7–23)
CALCIUM SERPL-MCNC: 6.7 MG/DL — LOW (ref 8.4–10.5)
CHLORIDE SERPL-SCNC: 105 MMOL/L — SIGNIFICANT CHANGE UP (ref 96–108)
CO2 SERPL-SCNC: 24 MMOL/L — SIGNIFICANT CHANGE UP (ref 22–31)
CREAT SERPL-MCNC: 0.74 MG/DL — SIGNIFICANT CHANGE UP (ref 0.5–1.3)
EGFR: 102 ML/MIN/1.73M2 — SIGNIFICANT CHANGE UP
GLUCOSE SERPL-MCNC: 96 MG/DL — SIGNIFICANT CHANGE UP (ref 70–99)
HCT VFR BLD CALC: 28.9 % — LOW (ref 39–50)
HGB BLD-MCNC: 9.7 G/DL — LOW (ref 13–17)
MCHC RBC-ENTMCNC: 29.8 PG — SIGNIFICANT CHANGE UP (ref 27–34)
MCHC RBC-ENTMCNC: 33.6 GM/DL — SIGNIFICANT CHANGE UP (ref 32–36)
MCV RBC AUTO: 88.9 FL — SIGNIFICANT CHANGE UP (ref 80–100)
NRBC # BLD: 0 /100 WBCS — SIGNIFICANT CHANGE UP (ref 0–0)
PLATELET # BLD AUTO: 334 K/UL — SIGNIFICANT CHANGE UP (ref 150–400)
POTASSIUM SERPL-MCNC: 4.2 MMOL/L — SIGNIFICANT CHANGE UP (ref 3.5–5.3)
POTASSIUM SERPL-SCNC: 4.2 MMOL/L — SIGNIFICANT CHANGE UP (ref 3.5–5.3)
RBC # BLD: 3.25 M/UL — LOW (ref 4.2–5.8)
RBC # FLD: 15.9 % — HIGH (ref 10.3–14.5)
SARS-COV-2 RNA SPEC QL NAA+PROBE: SIGNIFICANT CHANGE UP
SODIUM SERPL-SCNC: 135 MMOL/L — SIGNIFICANT CHANGE UP (ref 135–145)
WBC # BLD: 6.8 K/UL — SIGNIFICANT CHANGE UP (ref 3.8–10.5)
WBC # FLD AUTO: 6.8 K/UL — SIGNIFICANT CHANGE UP (ref 3.8–10.5)

## 2022-08-10 PROCEDURE — 36415 COLL VENOUS BLD VENIPUNCTURE: CPT

## 2022-08-10 PROCEDURE — 96375 TX/PRO/DX INJ NEW DRUG ADDON: CPT

## 2022-08-10 PROCEDURE — 99231 SBSQ HOSP IP/OBS SF/LOW 25: CPT

## 2022-08-10 PROCEDURE — 96365 THER/PROPH/DIAG IV INF INIT: CPT | Mod: XU

## 2022-08-10 PROCEDURE — 85610 PROTHROMBIN TIME: CPT

## 2022-08-10 PROCEDURE — 71045 X-RAY EXAM CHEST 1 VIEW: CPT

## 2022-08-10 PROCEDURE — 96367 TX/PROPH/DG ADDL SEQ IV INF: CPT

## 2022-08-10 PROCEDURE — 87040 BLOOD CULTURE FOR BACTERIA: CPT

## 2022-08-10 PROCEDURE — 83735 ASSAY OF MAGNESIUM: CPT

## 2022-08-10 PROCEDURE — 99239 HOSP IP/OBS DSCHRG MGMT >30: CPT

## 2022-08-10 PROCEDURE — 85730 THROMBOPLASTIN TIME PARTIAL: CPT

## 2022-08-10 PROCEDURE — 84100 ASSAY OF PHOSPHORUS: CPT

## 2022-08-10 PROCEDURE — 74177 CT ABD & PELVIS W/CONTRAST: CPT | Mod: MA

## 2022-08-10 PROCEDURE — 80048 BASIC METABOLIC PNL TOTAL CA: CPT

## 2022-08-10 PROCEDURE — 85025 COMPLETE CBC W/AUTO DIFF WBC: CPT

## 2022-08-10 PROCEDURE — 93005 ELECTROCARDIOGRAM TRACING: CPT

## 2022-08-10 PROCEDURE — 83605 ASSAY OF LACTIC ACID: CPT

## 2022-08-10 PROCEDURE — 99291 CRITICAL CARE FIRST HOUR: CPT | Mod: 25

## 2022-08-10 PROCEDURE — 80053 COMPREHEN METABOLIC PANEL: CPT

## 2022-08-10 PROCEDURE — 83690 ASSAY OF LIPASE: CPT

## 2022-08-10 PROCEDURE — P9047: CPT

## 2022-08-10 PROCEDURE — 87086 URINE CULTURE/COLONY COUNT: CPT

## 2022-08-10 PROCEDURE — 82962 GLUCOSE BLOOD TEST: CPT

## 2022-08-10 PROCEDURE — 87635 SARS-COV-2 COVID-19 AMP PRB: CPT

## 2022-08-10 PROCEDURE — 85027 COMPLETE CBC AUTOMATED: CPT

## 2022-08-10 PROCEDURE — 96361 HYDRATE IV INFUSION ADD-ON: CPT

## 2022-08-10 RX ORDER — SODIUM CHLORIDE 9 MG/ML
2 INJECTION INTRAMUSCULAR; INTRAVENOUS; SUBCUTANEOUS
Qty: 0 | Refills: 0 | DISCHARGE

## 2022-08-10 RX ORDER — PANTOPRAZOLE SODIUM 20 MG/1
1 TABLET, DELAYED RELEASE ORAL
Qty: 0 | Refills: 0 | DISCHARGE
Start: 2022-08-10

## 2022-08-10 RX ORDER — HYDROCORTISONE 20 MG
50 TABLET ORAL DAILY
Refills: 0 | Status: DISCONTINUED | OUTPATIENT
Start: 2022-08-10 | End: 2022-08-10

## 2022-08-10 RX ORDER — MIDODRINE HYDROCHLORIDE 2.5 MG/1
1 TABLET ORAL
Qty: 0 | Refills: 0 | DISCHARGE
Start: 2022-08-10

## 2022-08-10 RX ORDER — MAGNESIUM OXIDE 400 MG ORAL TABLET 241.3 MG
1 TABLET ORAL
Qty: 0 | Refills: 0 | DISCHARGE

## 2022-08-10 RX ORDER — SULFASALAZINE 500 MG
2 TABLET ORAL
Qty: 0 | Refills: 0 | DISCHARGE

## 2022-08-10 RX ORDER — MIDODRINE HYDROCHLORIDE 2.5 MG/1
1 TABLET ORAL
Qty: 0 | Refills: 0 | DISCHARGE

## 2022-08-10 RX ADMIN — MIDODRINE HYDROCHLORIDE 10 MILLIGRAM(S): 2.5 TABLET ORAL at 17:19

## 2022-08-10 RX ADMIN — Medication 5 MILLIGRAM(S): at 17:18

## 2022-08-10 RX ADMIN — Medication 50 MILLIGRAM(S): at 13:48

## 2022-08-10 RX ADMIN — PANTOPRAZOLE SODIUM 40 MILLIGRAM(S): 20 TABLET, DELAYED RELEASE ORAL at 05:53

## 2022-08-10 RX ADMIN — Medication 2 MILLIGRAM(S): at 11:45

## 2022-08-10 RX ADMIN — CALCITRIOL 0.25 MICROGRAM(S): 0.5 CAPSULE ORAL at 11:45

## 2022-08-10 RX ADMIN — Medication 50 MILLIGRAM(S): at 05:54

## 2022-08-10 RX ADMIN — APIXABAN 2.5 MILLIGRAM(S): 2.5 TABLET, FILM COATED ORAL at 05:54

## 2022-08-10 RX ADMIN — Medication 5 MILLIGRAM(S): at 05:53

## 2022-08-10 RX ADMIN — MIDODRINE HYDROCHLORIDE 10 MILLIGRAM(S): 2.5 TABLET ORAL at 05:53

## 2022-08-10 RX ADMIN — Medication 2 CAPSULE(S): at 07:55

## 2022-08-10 RX ADMIN — MIDODRINE HYDROCHLORIDE 10 MILLIGRAM(S): 2.5 TABLET ORAL at 11:45

## 2022-08-10 RX ADMIN — FLUPHENAZINE HYDROCHLORIDE 5 MILLIGRAM(S): 1 TABLET, FILM COATED ORAL at 05:53

## 2022-08-10 RX ADMIN — Medication 125 MICROGRAM(S): at 05:56

## 2022-08-10 RX ADMIN — Medication 2 CAPSULE(S): at 17:19

## 2022-08-10 RX ADMIN — Medication 100 MICROGRAM(S): at 05:53

## 2022-08-10 RX ADMIN — PANTOPRAZOLE SODIUM 40 MILLIGRAM(S): 20 TABLET, DELAYED RELEASE ORAL at 17:18

## 2022-08-10 RX ADMIN — Medication 2 CAPSULE(S): at 11:45

## 2022-08-10 RX ADMIN — FLUPHENAZINE HYDROCHLORIDE 5 MILLIGRAM(S): 1 TABLET, FILM COATED ORAL at 17:18

## 2022-08-10 RX ADMIN — APIXABAN 2.5 MILLIGRAM(S): 2.5 TABLET, FILM COATED ORAL at 17:18

## 2022-08-10 RX ADMIN — FLUDROCORTISONE ACETATE 0.1 MILLIGRAM(S): 0.1 TABLET ORAL at 05:53

## 2022-08-10 NOTE — CDI QUERY NOTE - NSCDIOTHERTXTBX_GEN_ALL_CORE_HH
Presents with SBO with Inguinal Hernia.    Advance Practice Nurse Consult - Of note, patient states he has a chronic wound on the sacrum; that it used to be 'very large'.  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  Upon assessment, a very small full thickness wound is found over the coccyx.  Wound measures 1 x 1 x 0.5 cm, with undermining at 9:00 of 1.5 cm; and a tunnel at 11:00, 3 cm deep.  Wound bed is not visible given the small opening. No drainage noted.  Periwound with some maceration to about 1 cm around.  There is some concern for opening of wound closing before tunneling has filled in.      Please clarify    - Decubitus ulcer coccyx - Stage 4                                      - Stage 3                                      - Stage 2                                      - Stage 1    - Other(specify)      Thank you

## 2022-08-10 NOTE — DISCHARGE NOTE NURSING/CASE MANAGEMENT/SOCIAL WORK - PATIENT PORTAL LINK FT
You can access the FollowMyHealth Patient Portal offered by [facility] by registering at the following website: [email or web address]. By joining [facility]’s FollowMyHealth portal, you will also be able to view your health information using other applications (apps) compatible with our system.

## 2022-08-10 NOTE — ADVANCED PRACTICE NURSE CONSULT - REASON FOR CONSULT
Assessment & Recommendations for Skin Excoriation in Abdominal Fold
Assessment of Illeostomy Stoma/Appliance

## 2022-08-10 NOTE — CDI QUERY NOTE - NSCDI_DOCCLARIFY2_GEN_ALL_CORE_FT
History of present illness:  Jessi is a 37 year old   New Patient patient who presents with complaints of heavy menses and dsymenorrhea for the last 4 cycles.  The pain was strong enough she used some Vicodin she had left over at home today for cramping.  She was seen in the ER at [facility] for the same issue.  She had a pelvic ultrasound done which showed: There is a left ovarian complex cyst with debris measuring 4.6 x 3.3 x 4.6 cm. Arterial flow is identified in both ovaries.   She is not on birth control and is hoping to become pregnant.    Review of systems: Pertinent items are noted in HPI.    No past medical history on file.  Past Surgical History:   Procedure Laterality Date   • Laparoscopy,enterolysis       OB History    Para Term  AB Living   0 0 0 0 0 0   SAB TAB Ectopic Molar Multiple Live Births   0 0 0 0 0 0           Social History     Social History   • Marital status: Unknown     Spouse name: N/A   • Number of children: N/A   • Years of education: N/A     Occupational History   • Not on file.     Social History Main Topics   • Smoking status: Current Every Day Smoker     Packs/day: 0.20     Years: 15.00   • Smokeless tobacco: Never Used      Comment: Smokes 2 cigarettes/day.  Refuses Quit Line info, wants to quit on her own.   • Alcohol use 0.0 - 4.8 oz/week   • Drug use: No   • Sexual activity: Not on file     Other Topics Concern   • Not on file     Social History Narrative   • No narrative on file     Current Outpatient Prescriptions   Medication Sig Dispense Refill   • Biotin 77949 MCG Tab      • Lactobacillus (PROBIOTIC ACIDOPHILUS PO)      • tranexamic acid (LYSTEDA) 650 MG tablet Take 2 tablets by mouth 3 times daily. For up to 5 days on menses 30 tablet 6     No current facility-administered medications for this visit.          Exam:  Well-developed female in no apparent distress.  Visit Vitals  /64   Ht 5' 7\" (1.702 m)   Wt 116.8 kg   LMP 2018 (Exact Date)    BMI 40.33 kg/m²     Lungs: CTA  Heart: RRR  Abdomen: soft, nt,nd    Assessment/plan:  Jessi was seen today for pain.    Diagnoses and all orders for this visit:    Cyst of right ovary  -     US PELVIS COMPLETE NON OB; Future    Other orders  -     tranexamic acid (LYSTEDA) 650 MG tablet; Take 2 tablets by mouth 3 times daily. For up to 5 days on menses         Will get a pelvic ultrasound and do a pelvic exam at her follow up visit.   Will discuss pregnancy planning.  Will need to loose weight and then consider referral to [facility].   Documentation clarification is required for accuracy in coding and billing, claim validation and reporting severity of illness, quality data and risk of mortality.

## 2022-08-10 NOTE — PROGRESS NOTE ADULT - ASSESSMENT
Physical Examination:  GENERAL:               Alert, Oriented, No acute distress.    HEENT:                    Pupils equal, reactive to light.  Symmetric. No JVD, Moist MM  PULM:                     Bilateral air entry, Clear to auscultation bilaterally, no significant sputum production, No Rales, No Rhonchi, No Wheezing  CVS:                         S1, S2,  No Murmur  ABD:                        Soft, nondistended, nontender, normoactive bowel sounds,   EXT:                         No edema, nontender, No Cyanosis or Clubbing   NEURO:                  Alert, Confused, interactive, nonfocal, follows commands  PSYC:                      Calm, Limited Insight.        Assessment  1. Incarcerated vs Strangulated right inguinal hernia s/p manual reduction/decompression  2. SBO related to above - Resolved  3. Lactic acidosis due to above - Resolved  4. PMHx Schizophrenia, Hyponatremia, Hypothyroidism , DM2         Plan  continue care on medical floor  solucortef taper order placed  Diet as per surgery     Continue Midodrine 10mg TID and Florinef 0.1 QD, low threshold for vasopressors should lactate and hypotension worsen  Monitor Ileostomy output    Clinically improving  d/w Dr. Boyer, reconsult icu as needed.

## 2022-08-10 NOTE — DISCHARGE NOTE PROVIDER - NSDCMRMEDTOKEN_GEN_ALL_CORE_FT
ascorbic acid 500 mg oral tablet: 1 tab(s) orally 3 times a day  atorvastatin 20 mg oral tablet: 1 tab(s) orally once a day  benztropine 2 mg oral tablet: 1 tab(s) orally once a day (at bedtime)  busPIRone 5 mg oral tablet: 1 tab(s) orally 2 times a day  calcitriol 0.25 mcg oral capsule: 1 cap(s) orally once a day  calcium (as carbonate) 600 mg oral tablet: 1 tab(s) orally 4 times a day  cholecalciferol 125 mcg (5000 intl units) oral tablet: 1 tab(s) orally once a day  digoxin 125 mcg (0.125 mg) oral tablet: 1 tab(s) orally once a day  Eliquis 2.5 mg oral tablet: 1 tab(s) orally 2 times a day  ferrous sulfate 325 mg (65 mg elemental iron) oral tablet: 1 tab(s) orally 3 times a day  fludrocortisone 0.1 mg oral tablet: 1 tab(s) orally once a day  fluPHENAZine 5 mg oral tablet: 1 tab(s) orally 2 times a day  lactobacillus acidophilus oral capsule: 1 cap(s) orally once a day  Levoxyl 50 mcg (0.05 mg) oral tablet: 2 tab(s) orally once a day  magnesium oxide 400 mg oral tablet: 1 tab(s) orally 2 times a day  midodrine 5 mg oral tablet: 1 tab(s) orally 3 times a day  Multiple Vitamins with Minerals oral tablet: 1 tab(s) orally once a day  ondansetron 4 mg oral tablet: 1 tab(s) orally every 6 hours, As Needed  Pancreaze 4200 units-24,600 units-14,200 units oral delayed release capsule: 3 cap(s) orally 3 times a day (with meals)  predniSONE 20 mg oral tablet: 0.5 tab(s) orally once a day  silver sulfADIAZINE 1% topical cream: Apply topically to affected area 2 times a day  Sodium Chloride 1 g oral tablet: 2 tab(s) orally 3 times a day  sulfaSALAzine 500 mg oral tablet: 2 tab(s) orally 2 times a day   ascorbic acid 500 mg oral tablet: 1 tab(s) orally 3 times a day  atorvastatin 20 mg oral tablet: 1 tab(s) orally once a day  benztropine 2 mg oral tablet: 1 tab(s) orally once a day (at bedtime)  busPIRone 5 mg oral tablet: 1 tab(s) orally 2 times a day  calcitriol 0.25 mcg oral capsule: 1 cap(s) orally once a day  calcium (as carbonate) 600 mg oral tablet: 1 tab(s) orally 4 times a day  cholecalciferol 125 mcg (5000 intl units) oral tablet: 1 tab(s) orally once a day  digoxin 125 mcg (0.125 mg) oral tablet: 1 tab(s) orally once a day  Eliquis 2.5 mg oral tablet: 1 tab(s) orally 2 times a day  ferrous sulfate 325 mg (65 mg elemental iron) oral tablet: 1 tab(s) orally 3 times a day  fludrocortisone 0.1 mg oral tablet: 1 tab(s) orally once a day  fluPHENAZine 5 mg oral tablet: 1 tab(s) orally 2 times a day  lactobacillus acidophilus oral capsule: 1 cap(s) orally once a day  Levoxyl 50 mcg (0.05 mg) oral tablet: 2 tab(s) orally once a day  magnesium oxide 400 mg oral tablet: 1 tab(s) orally 2 times a day  midodrine 10 mg oral tablet: 1 tab(s) orally 3 times a day  Multiple Vitamins with Minerals oral tablet: 1 tab(s) orally once a day  ondansetron 4 mg oral tablet: 1 tab(s) orally every 6 hours, As Needed  Pancreaze 4200 units-24,600 units-14,200 units oral delayed release capsule: 3 cap(s) orally 3 times a day (with meals)  pantoprazole 40 mg oral delayed release tablet: 1 tab(s) orally 2 times a day  predniSONE 20 mg oral tablet: 0.5 tab(s) orally once a day  Sodium Chloride 1 g oral tablet: 2 tab(s) orally 3 times a day   ascorbic acid 500 mg oral tablet: 1 tab(s) orally 3 times a day  atorvastatin 20 mg oral tablet: 1 tab(s) orally once a day  benztropine 2 mg oral tablet: 1 tab(s) orally once a day (at bedtime)  busPIRone 5 mg oral tablet: 1 tab(s) orally 2 times a day  calcitriol 0.25 mcg oral capsule: 1 cap(s) orally once a day  calcium (as carbonate) 600 mg oral tablet: 1 tab(s) orally 4 times a day  cholecalciferol 125 mcg (5000 intl units) oral tablet: 1 tab(s) orally once a day  digoxin 125 mcg (0.125 mg) oral tablet: 1 tab(s) orally once a day  Eliquis 2.5 mg oral tablet: 1 tab(s) orally 2 times a day  ferrous sulfate 325 mg (65 mg elemental iron) oral tablet: 1 tab(s) orally 3 times a day  fludrocortisone 0.1 mg oral tablet: 1 tab(s) orally once a day  fluPHENAZine 5 mg oral tablet: 1 tab(s) orally 2 times a day  lactobacillus acidophilus oral capsule: 1 cap(s) orally once a day  Levoxyl 50 mcg (0.05 mg) oral tablet: 2 tab(s) orally once a day  midodrine 10 mg oral tablet: 1 tab(s) orally 3 times a day  Multiple Vitamins with Minerals oral tablet: 1 tab(s) orally once a day  ondansetron 4 mg oral tablet: 1 tab(s) orally every 6 hours, As Needed  Pancreaze 4200 units-24,600 units-14,200 units oral delayed release capsule: 3 cap(s) orally 3 times a day (with meals)  pantoprazole 40 mg oral delayed release tablet: 1 tab(s) orally 2 times a day  predniSONE 20 mg oral tablet: 0.5 tab(s) orally once a day

## 2022-08-10 NOTE — PROGRESS NOTE ADULT - SUBJECTIVE AND OBJECTIVE BOX
Patient is a 62y old  Male who presents with a chief complaint of Incarcerated right inguinal hernia with SBO (10 Aug 2022 08:09)      Patient seen and examined at bedside. No overnight events reported.  Pt states "I am just tired".    ALLERGIES:  No Known Allergies    MEDICATIONS  (STANDING):  apixaban 2.5 milliGRAM(s) Oral every 12 hours  atorvastatin 20 milliGRAM(s) Oral at bedtime  benztropine 2 milliGRAM(s) Oral daily  busPIRone 5 milliGRAM(s) Oral two times a day  calcitriol   Capsule 0.25 MICROGram(s) Oral daily  dextrose 5% + lactated ringers. 1000 milliLiter(s) (75 mL/Hr) IV Continuous <Continuous>  digoxin     Tablet 125 MICROGram(s) Oral daily  fludroCORTISONE 0.1 milliGRAM(s) Oral daily  fluPHENAZine 5 milliGRAM(s) Oral two times a day  hydrocortisone sodium succinate Injectable   IV Push   hydrocortisone sodium succinate Injectable 50 milliGRAM(s) IV Push every 8 hours  levothyroxine 100 MICROGram(s) Oral daily  midodrine. 10 milliGRAM(s) Oral three times a day  pancrelipase Oral Capsule (CREON 12,000 Lipase Units) - Peds 2 Capsule(s) Oral three times a day with meals  pantoprazole    Tablet 40 milliGRAM(s) Oral two times a day    MEDICATIONS  (PRN):  acetaminophen     Tablet .. 650 milliGRAM(s) Oral every 6 hours PRN Temp greater or equal to 38C (100.4F), Mild Pain (1 - 3)  aluminum hydroxide/magnesium hydroxide/simethicone Suspension 30 milliLiter(s) Oral every 4 hours PRN Dyspepsia  melatonin 3 milliGRAM(s) Oral at bedtime PRN Insomnia  ondansetron Injectable 4 milliGRAM(s) IV Push every 8 hours PRN Nausea and/or Vomiting    Vital Signs Last 24 Hrs  T(F): 97.4 (10 Aug 2022 08:53), Max: 98.3 (10 Aug 2022 05:33)  HR: 78 (10 Aug 2022 08:53) (78 - 91)  BP: 124/58 (10 Aug 2022 08:53) (85/52 - 124/58)  RR: 17 (10 Aug 2022 08:53) (16 - 17)  SpO2: 97% (10 Aug 2022 08:53) (97% - 98%)  I&O's Summary    09 Aug 2022 07:01  -  10 Aug 2022 07:00  --------------------------------------------------------  IN: 0 mL / OUT: 1700 mL / NET: -1700 mL      PHYSICAL EXAM:  General: NAD, A/O x 2-3  ENT: No gross hearing impairment, Moist mucous membranes, no thrush  Neck: Supple, No JVD  Lungs: Clear to auscultation bilaterally, good air entry, non-labored breathing  Cardio: RRR, S1/S2, No murmur  Abdomen: Soft, +colostomy bag in place, skin excoriations around bag area, Nontender, Nondistended; Bowel sounds present  Extremities: No calf tenderness, No cyanosis, No pitting edema  Neuro:  alert, nonfocal    LABS:                        9.7    6.80  )-----------( 334      ( 10 Aug 2022 06:18 )             28.9     08-10    135  |  105  |  17  ----------------------------<  96  4.2   |  24  |  0.74    Ca    6.7      10 Aug 2022 06:18  Phos  4.5     08-08  Mg     1.1     08-08    TPro  3.7  /  Alb  1.6  /  TBili  0.4  /  DBili  x   /  AST  23  /  ALT  36  /  AlkPhos  46  08-08      Lipase, Serum: 88 U/L (08-06-22 @ 19:30)        Lactate, Blood: 0.6 mmol/L (08-08 @ 06:10)  Lactate, Blood: 2.4 mmol/L (08-07 @ 12:05)                                Culture - Urine (collected 07 Aug 2022 07:00)  Source: Clean Catch Clean Catch (Midstream)  Final Report (08 Aug 2022 07:28):    No growth    Culture - Blood (collected 06 Aug 2022 21:20)  Source: .Blood Blood-Peripheral  Preliminary Report (08 Aug 2022 06:01):    No growth to date.    Culture - Blood (collected 06 Aug 2022 21:20)  Source: .Blood Blood-Peripheral  Preliminary Report (08 Aug 2022 06:01):    No growth to date.      COVID-19 PCR: NotDetec (08-06-22 @ 19:30)    RADIOLOGY & ADDITIONAL TESTS:    Care Discussed with Consultants/Other Providers:

## 2022-08-10 NOTE — PROGRESS NOTE ADULT - TIME BILLING
examination, data collection, discussion of plan with care team.

## 2022-08-10 NOTE — ADVANCED PRACTICE NURSE CONSULT - RECOMMEDATIONS
Suggest using larger 2 & 3/4 inch appliance, cut to fit the stoma size, as patient reports swelling/prolapse periodically.  Educated patient on importance of maintaining clean dry skin & reporting/changing leaky appliance to avoid skin irritation.  Follow up out patient with Ostomy Nurse/Physician.
Suggest twice daily soap & water cleanse of abdominal fold; pat dry.  Apply Barrier Cream to skin fold, except next to ostomy wafer.  There is some concern for appliance being small for stoma size.   Storeroom currently closed, unable to retrieve larger appliance.  Appliance not currently leaking.  Will return tomorrow to measure stoma and change ostomy wafer & appliance.    Suggest cleanse sacral wound & gently irrigate with normal saline daily; pat dry.  Apply Cavillon or other film barrier to periwound daily.  Moisten a 1/4" plain packing strip with normal saline, use cotton tipped applicator to loosely fill tunnel & undermined area, leaving a 'tail' once daily.  Cover with small Allevyn foam dressing.    Offload all bony prominences with strict Turn & Position at least every 2 hours.  Limit number of layers between patient & mattress.  Nutritional support per Dietician's recommendations.

## 2022-08-10 NOTE — DISCHARGE NOTE PROVIDER - HOSPITAL COURSE
Hospital Course    61yo male with PMH schizophrenia, chronic hyponatremia, chronic hypotension, hypothyroidism, HLD, A fib on eliquis, PSH total colectomy and ileostomy formation sent to ED by [facility] for abdominal pain and lack of ileostomy output. Noted to have SBO 2/2 right inguinal hernia     #Incarcerated vs Strangulated right inguinal hernia s/p manual reduction/decompression  #SBO related to above - Resolved  #Lactic acidosis due to above - Resolved  - Hernia reduced at bedside in ED  - Currently tolerating regular diet  - Empiric zosyn DC'ed   - Surgery following - consider inguinal hernia repair in the near future, plan for outpatient follow up with Dr Stewart in 1-2 weeks   - Wound care for excoriations around ileostomy; Wound care nurse recs noted    #Hypotension   - Unclear baseline BP,  but BP's improving  - Continue Midodrine 10mg TID and Florinef 0.1 QD  - will stop hydrocortisone IV, will change to oral hydrocortisone since pt is being discharged    #Hyponatremia; improved  - Reportedly chronic, unclear baseline  - Improving  - On sodium chloride tablets at home     #Hypokalemia; resolved  - Supplemented    #Anemia  - Suspect hemodilution from IVF, continue to monitor   - H/H stable, 9.7/28    #Schizophrenia  - Continue home meds: benztropine, buspirone, fluphenazine    #A fib  - Continue digoxin  - Eliquis resumed     #HLD   - Will resume statin     #Hypothyroidism  - Continue synthroid     #Pancreatic insufficiency  - Continue creon     #GI PPX: Protonix                        Palliative Care / Advanced Care Planning  Code Status:  Patient/Family agreeable to Hospice/Palliative (Y/N)?  Summary of Goals of Care Conversation:    Discharging Provider:  LUCIO Foster  Contact Info: Cell [phone] - Please call with any questions or concerns.    Outpatient Provider:    Signout given to  SNF Provider:       Hospital Course    63yo male with PMH schizophrenia, chronic hyponatremia, chronic hypotension, hypothyroidism, HLD, A fib on eliquis, PSH total colectomy and ileostomy formation sent to ED by [facility] for abdominal pain and lack of ileostomy output.   Noted to have SBO on CT of A/P on admission; 2/2 right inguinal hernia.   Pt with lactic acidosis and persistent hypotension on admission; ICU consult was placed and pt was observed in ICU for 24 hrs.  He did improve and downgraded to a medical floor.  He was evaluated by Surgery.  He had an Incarcerated vs Strangulated right inguinal hernia s/p manual reduction/decompression by Surger  #SBO related to above - Resolved  #Lactic acidosis due to above - Resolved  - Hernia reduced at bedside in ED  - Currently tolerating regular diet  - Empiric zosyn TN'ed   - Surgery following - consider inguinal hernia repair in the near future, plan for outpatient follow up with Dr Stewart in 1-2 weeks   - Wound care for excoriations around ileostomy; Wound care nurse raulito noted    #Hypotension   - Unclear baseline BP,  but BP's improving  - Continue Midodrine 10mg TID and Florinef 0.1 QD  - will stop hydrocortisone IV, will change to oral hydrocortisone since pt is being discharged    #Hyponatremia; improved  - Reportedly chronic, unclear baseline  - Improving  - On sodium chloride tablets at home     #Hypokalemia; resolved  - Supplemented    #Anemia  - Suspect hemodilution from IVF, continue to monitor   - H/H stable, 9.7/28    #Schizophrenia  - Continue home meds: benztropine, buspirone, fluphenazine    #A fib  - Continue digoxin  - Eliquis resumed     #HLD   - Will resume statin     #Hypothyroidism  - Continue synthroid     #Pancreatic insufficiency  - Continue creon     #GI PPX: Protonix                        Palliative Care / Advanced Care Planning  Code Status:  Patient/Family agreeable to Hospice/Palliative (Y/N)?  Summary of Goals of Care Conversation:    Discharging Provider:  LUCIO Foster  Contact Info: Cell [phone] - Please call with any questions or concerns.    Outpatient Provider:    Signout given to  SNF Provider:       Hospital Course    61yo male with PMH schizophrenia, chronic hyponatremia, chronic hypotension, hypothyroidism, HLD, A fib on eliquis, PSH total colectomy and ileostomy formation sent to ED by [facility] for abdominal pain and lack of ileostomy output.   Noted to have SBO on CT of A/P on admission; 2/2 right inguinal hernia.   Pt with lactic acidosis and persistent hypotension on admission; ICU consult was placed and pt was observed in ICU for 24 hrs.  He did improve and downgraded to a medical floor.  He was evaluated by Surgery.  He had an Incarcerated vs Strangulated right inguinal hernia s/p manual reduction/decompression by Surgery.  Also with lactic acidosis due to SBO which did resolve after IVF.  Empiric zosyn DC'ed.  Per surgery; consider inguinal hernia repair in the near future, plan for outpatient follow up with Dr Stewart in 1-2 weeks.  He was seen by Wound Care Nurse for excoriations around ileostomy.    Pt had medications adjusted for hypotension -- Midodrine 10mg TID and Florinef 0.1 QD and will restart home steroid regimen.  He had Hyponatremia and hypokalemia.  He had K repleted and improved.  Pt. did stabilize and to be discharged back to long term care at [facility].    Palliative Care / Advanced Care Planning  Code Status:  FULL CODE  Patient/Family agreeable to Hospice/Palliative - NO    Discharging Provider:  LUCIO Foster  Contact Info: Cell [phone] - Please call with any questions or concerns.    Signout given to  SNF Provider:  Dr Anayeli Cash       Hospital Course    63yo male with PMH schizophrenia, chronic hyponatremia, chronic hypotension, hypothyroidism, HLD, A fib on eliquis, PSH total colectomy and ileostomy formation sent to ED by [facility] for abdominal pain and lack of ileostomy output.   Noted to have SBO on CT of A/P on admission; 2/2 right inguinal hernia.   Pt with lactic acidosis and persistent hypotension on admission; ICU consult was placed and pt was observed in ICU for 24 hrs.  He did improve and downgraded to a medical floor.  He was evaluated by Surgery.  He had an Incarcerated vs Strangulated right inguinal hernia s/p manual reduction/decompression by Surgery.  Also with lactic acidosis due to SBO which did resolve after IVF.  Empiric zosyn DC'ed.  Per surgery; consider inguinal hernia repair in the near future, plan for outpatient follow up with Dr Stewart in 1-2 weeks.  He was seen by Wound Care Nurse for excoriations around ileostomy.    Pt had medications adjusted for hypotension -- Midodrine 10mg TID and Florinef 0.1 QD and will restart home steroid regimen.  He had Hyponatremia and hypokalemia.  He had K repleted and improved.  Pt. did stabilize and to be discharged back to long term care at [facility].    Palliative Care / Advanced Care Planning  Code Status:  FULL CODE  Patient/Family agreeable to Hospice/Palliative - NO    Discharging Provider:  LUCIO Foster  Contact Info: Cell [phone] - Please call with any questions or concerns.    Signout given to  SNF Provider:  Dr Anayeli Cash I, the attending physician, was physically present for the key portions of the evaluation and management (E/M) service provided. The total amount of time spent reviewing the hospital course, laboratory values, imaging findings, assessing/counseling the patient, discussing with consultant physicians, social work, nursing staff was 32 minutes.

## 2022-08-10 NOTE — ADVANCED PRACTICE NURSE CONSULT - ASSESSMENT
Retuned to bedside at 13:30 today, illeostomy extremely active at that time, putting out very loose stool.  Returned again at 16:30, stoma less active.  Abdominal folds washed with warm soapy water & thoroughly patted dry.  Very mild erythema noted, decreased since yesterday.  Skin film barrier applied, allowed to dry.  Old appliance removed, using Uni-Solve adhesive remover.   Peristomal skin cleaned & dried.   Stoma prolapsed, however it appears smaller than yesterday, moist with good red color.  Patient states the stoma prolapses and swells from time to time.  Stoma measured to 2 & 1/4 inch.  Film Barrier applied & allowed to dry.  Mago's ring paste placed around stoma, filling in abdominal folds near umbilicus.  New 2 & 3/4 inch wafer with opening cut to 2 & 1/4 inch placed.  Drainable bag attached & sealed.  Barrier cream then applied to Abdominal fold, distally from wafer.

## 2022-08-10 NOTE — PROGRESS NOTE ADULT - ASSESSMENT
63yo male with PMH schizophrenia, chronic hyponatremia, chronic hypotension, hypothyroidism, HLD, A fib on eliquis, PSH total colectomy and ileostomy formation sent to ED by [facility] for abdominal pain and lack of ileostomy output. Noted to have SBO 2/2 right inguinal hernia     #Incarcerated vs Strangulated right inguinal hernia s/p manual reduction/decompression  #SBO related to above - Resolved  #Lactic acidosis due to above - Resolved  - Hernia reduced at bedside in ED  - Currently tolerating regular diet  - Empiric zosyn DC'ed   - Surgery following - consider inguinal hernia repair in the near future, plan for outpatient follow up with Dr Stewart in 1-2 weeks   - Wound care for excoriations around ileostomy; Wound care nurse recs noted    #Hypotension   - Unclear baseline BP,  but BP's improving  - Continue Midodrine 10mg TID and Florinef 0.1 QD  - will stop hydrocortisone IV, will change to oral hydrocortisone since pt is being discharged    #Hyponatremia; improved  - Reportedly chronic, unclear baseline  - Improving  - On sodium chloride tablets at home     #Hypokalemia; resolved  - Supplemented    #Anemia  - Suspect hemodilution from IVF, continue to monitor   - H/H stable, 9.7/28    #Schizophrenia  - Continue home meds: benztropine, buspirone, fluphenazine    #A fib  - Continue digoxin  - Eliquis resumed     #HLD   - Will resume statin     #Hypothyroidism  - Continue synthroid     #Pancreatic insufficiency  - Continue creon     #GI PPX: Protonix    #DVT ppx- Eliquis    [facility]: Full CODE    Dispo:  D/C back to  today,  sister Marialuisa Washington called at [phone], left message on voicemail.

## 2022-08-10 NOTE — DISCHARGE NOTE NURSING/CASE MANAGEMENT/SOCIAL WORK - NSDCPEFALRISK_GEN_ALL_CORE
For information on Fall & Injury Prevention, visit: [email or web address] OR  [email or web address] OR  https://www.cdc.gov/steadi/patient.html

## 2022-08-10 NOTE — PROGRESS NOTE ADULT - SUBJECTIVE AND OBJECTIVE BOX
Follow-up Pulmonary Progress Note  Chief Complaint : Intestinal obstruction          No new events overnight.  Denies SOB/CP.   comfortable    Allergies :No Known Allergies      PAST MEDICAL & SURGICAL HISTORY:  Atrial fibrillation    Heart failure    History of hypotension    Generalized anxiety disorder    Type 2 diabetes mellitus    Presence of ileostomy    S/P cholecystectomy        Medications:  MEDICATIONS  (STANDING):  apixaban 2.5 milliGRAM(s) Oral every 12 hours  atorvastatin 20 milliGRAM(s) Oral at bedtime  benztropine 2 milliGRAM(s) Oral daily  busPIRone 5 milliGRAM(s) Oral two times a day  calcitriol   Capsule 0.25 MICROGram(s) Oral daily  dextrose 5% + lactated ringers. 1000 milliLiter(s) (75 mL/Hr) IV Continuous <Continuous>  digoxin     Tablet 125 MICROGram(s) Oral daily  fludroCORTISONE 0.1 milliGRAM(s) Oral daily  fluPHENAZine 5 milliGRAM(s) Oral two times a day  hydrocortisone 50 milliGRAM(s) Oral daily  levothyroxine 100 MICROGram(s) Oral daily  midodrine. 10 milliGRAM(s) Oral three times a day  pancrelipase Oral Capsule (CREON 12,000 Lipase Units) - Peds 2 Capsule(s) Oral three times a day with meals  pantoprazole    Tablet 40 milliGRAM(s) Oral two times a day    MEDICATIONS  (PRN):  acetaminophen     Tablet .. 650 milliGRAM(s) Oral every 6 hours PRN Temp greater or equal to 38C (100.4F), Mild Pain (1 - 3)  aluminum hydroxide/magnesium hydroxide/simethicone Suspension 30 milliLiter(s) Oral every 4 hours PRN Dyspepsia  melatonin 3 milliGRAM(s) Oral at bedtime PRN Insomnia  ondansetron Injectable 4 milliGRAM(s) IV Push every 8 hours PRN Nausea and/or Vomiting      Antibiotics History  piperacillin/tazobactam IVPB.. 3.375 Gram(s) IV Intermittent every 8 hours, 08-07-22 @ 01:13, Stop order after: 7 Days  piperacillin/tazobactam IVPB... 3.375 Gram(s) IV Intermittent once, 08-06-22 @ 21:35, Stop order after: 1 Doses  vancomycin  IVPB. 1000 milliGRAM(s) IV Intermittent once, 08-06-22 @ 21:35, Stop order after: 1 Doses      Heme Medications   apixaban 2.5 milliGRAM(s) Oral every 12 hours, 08-09-22 @ 18:00      GI Medications  aluminum hydroxide/magnesium hydroxide/simethicone Suspension 30 milliLiter(s) Oral every 4 hours, 08-07-22 @ 01:54, Routine PRN  pancrelipase Oral Capsule (CREON 12,000 Lipase Units) - Peds 2 Capsule(s) Oral three times a day with meals, 08-07-22 @ 04:00, Routine  pantoprazole    Tablet 40 milliGRAM(s) Oral two times a day, 08-08-22 @ 15:36, Routine        LABS:                        9.7    6.80  )-----------( 334      ( 10 Aug 2022 06:18 )             28.9     08-10    135  |  105  |  17  ----------------------------<  96  4.2   |  24  |  0.74    Ca    6.7<L>      10 Aug 2022 06:18                          CULTURES: (if applicable)    Culture - Urine (collected 08-07-22 @ 07:00)  Source: Clean Catch Clean Catch (Midstream)  Final Report (08-08-22 @ 07:28):    No growth    Culture - Blood (collected 08-06-22 @ 21:20)  Source: .Blood Blood-Peripheral  Preliminary Report (08-08-22 @ 06:01):    No growth to date.    Culture - Blood (collected 08-06-22 @ 21:20)  Source: .Blood Blood-Peripheral  Preliminary Report (08-08-22 @ 06:01):    No growth to date.     VITALS:  T(C): 36.1 (08-10-22 @ 12:30), Max: 36.8 (08-10-22 @ 05:33)  T(F): 97 (08-10-22 @ 12:30), Max: 98.3 (08-10-22 @ 05:33)  HR: 88 (08-10-22 @ 12:30) (78 - 91)  BP: 124/58 (08-10-22 @ 08:53) (85/60 - 124/58)  BP(mean): 67 (08-09-22 @ 17:43) (67 - 67)  ABP: --  ABP(mean): --  RR: 18 (08-10-22 @ 12:30) (16 - 18)  SpO2: 98% (08-10-22 @ 12:30) (97% - 98%)  CVP(mm Hg): --  CVP(cm H2O): --    Ins and Outs     08-09-22 @ 07:01  -  08-10-22 @ 07:00  --------------------------------------------------------  IN: 0 mL / OUT: 1700 mL / NET: -1700 mL    08-10-22 @ 07:01  -  08-10-22 @ 14:34  --------------------------------------------------------  IN: 450 mL / OUT: 0 mL / NET: 450 mL                I&O's Detail    09 Aug 2022 07:01  -  10 Aug 2022 07:00  --------------------------------------------------------  IN:  Total IN: 0 mL    OUT:    Colostomy (mL): 1300 mL    Voided (mL): 400 mL  Total OUT: 1700 mL    Total NET: -1700 mL      10 Aug 2022 07:01  -  10 Aug 2022 14:34  --------------------------------------------------------  IN:    dextrose 5% + lactated ringers: 450 mL  Total IN: 450 mL    OUT:  Total OUT: 0 mL    Total NET: 450 mL

## 2022-08-10 NOTE — PROGRESS NOTE ADULT - NUTRITIONAL ASSESSMENT
This patient has been assessed with a concern for Malnutrition and has been determined to have a diagnosis/diagnoses of Moderate protein-calorie malnutrition.    This patient is being managed with:   Diet DASH/TLC-  Sodium & Cholesterol Restricted  Supplement Feeding Modality:  Oral  Ensure Enlive Cans or Servings Per Day:  1       Frequency:  Three Times a day  Entered: Aug  8 2022 11:00AM    

## 2022-08-10 NOTE — PROGRESS NOTE ADULT - PROVIDER SPECIALTY LIST ADULT
Surgery
Hospitalist
Critical Care
Critical Care
Surgery
Critical Care
Hospitalist
Pulmonology
Surgery
Surgery

## 2022-08-10 NOTE — DISCHARGE NOTE PROVIDER - CARE PROVIDER_API CALL
Reyes, John A (MD)  Internal Medicine  [address], [address]  [place] [address]  Phone: [phone]  Fax: [phone]  Follow Up Time:

## 2022-08-10 NOTE — PROGRESS NOTE ADULT - ASSESSMENT
Impression/Plan:  CINTHYA GUZMAN is a 61yo man with SBO caused by an incarcerated right inguinal hernia.    #SBO 2/to RIGHT INGUINAL HERNIA  - SBO resolved s/p hernia reduction.   - Continue solid diet with protein supplements.   - Recommended to patient that he consider inguinal hernia repair in the near future.   - Ideally would wait for skin irritation and nutritional status improves.   - Appreciate wound care recommendations.   - Consideration of rehab facility with good ostomy care capabilities.   - Would appreciate medical optimization in anticipation of surgery.    - Counseled patient on symptoms that should prompt earlier return to ED.     --------------------------------------------------------    Subjective:  Continues to have good output from ileostomy. Tolerating diet without nausea or vomiting.     Objective:  T(C): 36.8, Max: 36.8 (08-10-22 @ 05:33)  HR: 91 (86 - 91)  BP: 85/60 (85/52 - 97/62)  RR: 16 (16 - 16)  SpO2: 98% (97% - 98%)    Physical Exam  ABD soft  No tenderness.   RLQ ileostomy with gas and liquid stool. Prolapsed ostomy with viable mucosa.     Laboratory Data            9.7                 135   | 105  | 17     6.80  )------( 334       --------------------<96                 28.9                4.2   | 24   | 0.74     TP    x    | ALB x         CA 6.7           PT x        ----------------------  AST x    | ALT x         MAG x          PTT x                ----------------------  TBIL x    | DBIL x         PHOS x         INR x       ----------------------  ALP x        Imaging Data  CT ABD/PEL 8/6/22:  SBO with transition at right inguinal hernia.

## 2022-08-10 NOTE — DISCHARGE NOTE PROVIDER - NSDCCPCAREPLAN_GEN_ALL_CORE_FT
PRINCIPAL DISCHARGE DIAGNOSIS  Diagnosis: Bowel obstruction  Assessment and Plan of Treatment:       SECONDARY DISCHARGE DIAGNOSES  Diagnosis: Incarcerated hernia  Assessment and Plan of Treatment:      PRINCIPAL DISCHARGE DIAGNOSIS  Diagnosis: Bowel obstruction  Assessment and Plan of Treatment: -follow up with Surgeon, Dr. Stewart for elective hernia repair  -continue colostomy care at [facility]      SECONDARY DISCHARGE DIAGNOSES  Diagnosis: Incarcerated hernia  Assessment and Plan of Treatment: -as above, will need hernia repair--- schedule as an outpatient      Diagnosis: Hypotension  Assessment and Plan of Treatment:   -Midodrine dose was increased during this hospital stay  -continue Florinef  -continue oral steroids     PRINCIPAL DISCHARGE DIAGNOSIS  Diagnosis: Bowel obstruction  Assessment and Plan of Treatment:   -follow up with Surgeon, Dr. Stewart for elective hernia repair  -continue colostomy care at [facility]      SECONDARY DISCHARGE DIAGNOSES  Diagnosis: Incarcerated hernia  Assessment and Plan of Treatment:   -as above, will need hernia repair--- schedule as an outpatient      Diagnosis: Hypotension  Assessment and Plan of Treatment:   -Midodrine dose was increased during this hospital stay  -continue Florinef  -continue oral steroids

## 2022-08-10 NOTE — PROGRESS NOTE ADULT - REASON FOR ADMISSION
#SBO 2/to RIGHT INGUINAL HERNIA
Incarcerated right inguinal hernia with SBO
abd pain
abd pain
Incarcerated right inguinal hernia with SBO
Hypotension
Incarcerated right inguinal hernia with SBO
Lactic acidosis, incarcerated hernia,
abdominal pain

## 2022-08-10 NOTE — DISCHARGE NOTE PROVIDER - DETAILS OF MALNUTRITION DIAGNOSIS/DIAGNOSES
This patient has been assessed with a concern for Malnutrition and was treated during this hospitalization for the following Nutrition diagnosis/diagnoses:     -  08/08/2022: Moderate protein-calorie malnutrition

## 2022-08-12 LAB
CULTURE RESULTS: SIGNIFICANT CHANGE UP
CULTURE RESULTS: SIGNIFICANT CHANGE UP
SPECIMEN SOURCE: SIGNIFICANT CHANGE UP
SPECIMEN SOURCE: SIGNIFICANT CHANGE UP

## 2022-08-16 PROBLEM — I48.91 UNSPECIFIED ATRIAL FIBRILLATION: Chronic | Status: ACTIVE | Noted: 2022-08-06

## 2022-08-16 PROBLEM — I50.9 HEART FAILURE, UNSPECIFIED: Chronic | Status: ACTIVE | Noted: 2022-08-06

## 2022-08-16 PROBLEM — F41.1 GENERALIZED ANXIETY DISORDER: Chronic | Status: ACTIVE | Noted: 2022-08-06

## 2022-08-16 PROBLEM — E11.9 TYPE 2 DIABETES MELLITUS WITHOUT COMPLICATIONS: Chronic | Status: ACTIVE | Noted: 2022-08-06

## 2022-08-16 PROBLEM — Z86.79 PERSONAL HISTORY OF OTHER DISEASES OF THE CIRCULATORY SYSTEM: Chronic | Status: ACTIVE | Noted: 2022-08-06

## 2022-08-18 ENCOUNTER — APPOINTMENT (OUTPATIENT)
Dept: SURGERY | Facility: CLINIC | Age: 62
End: 2022-08-18

## 2022-08-18 VITALS
SYSTOLIC BLOOD PRESSURE: 91 MMHG | HEIGHT: 73 IN | OXYGEN SATURATION: 100 % | DIASTOLIC BLOOD PRESSURE: 62 MMHG | WEIGHT: 160 LBS | TEMPERATURE: 97.7 F | HEART RATE: 89 BPM | BODY MASS INDEX: 21.2 KG/M2

## 2022-08-18 DIAGNOSIS — Z87.19 PERSONAL HISTORY OF OTHER DISEASES OF THE DIGESTIVE SYSTEM: ICD-10-CM

## 2022-08-18 PROCEDURE — 99213 OFFICE O/P EST LOW 20 MIN: CPT

## 2022-08-18 NOTE — HISTORY OF PRESENT ILLNESS
[de-identified] : CINTHYA GUZMAN is a 62 year-old man who was recently treated at [facility] from 8/6 - 8/10 for small bowel obstruction secondary to an incarcerated right inguinal hernia. His history is pertinent for a total abdominal colectomy and end ileostomy, DM2, HTN. His right inguinal hernia was reduced and his SBO subsequently resolved. Given significant skin irritation of his RLQ and groin, an inguinal hernia repair was deferred due to concerns for wound infection and poor. Furthermore, he was noted to have significant hypoalbuminemia. He was evaluated by an ostomy nurse while inpatient. \par  [de-identified] : While at this assisted-living facility, the patient has been able to tolerate food and drinks without nausea or vomiting. He is having regular ostomy output. He reports that his right inguinal bulge has returned twice, however, has been able to self-reduce. No other major concerns at this time.

## 2022-08-18 NOTE — PHYSICAL EXAM
[JVD] : no jugular venous distention  [Normal Breath Sounds] : Normal breath sounds [Respiratory Effort] : normal respiratory effort [Normal Rate and Rhythm] : normal rate and rhythm [Alert] : alert [Oriented to Person] : oriented to person [Oriented to Place] : oriented to place [Oriented to Time] : oriented to time [Calm] : calm [de-identified] : No distress.  [de-identified] : RLQ ostomy with prolapse and viable mucosa. Reducible. Separation of ostomy appliance from skin. Erythema of skin below the ostomy extending to the groin. Reducible inguinal hernia.  [de-identified] : G [de-identified] : No joint swelling.  [de-identified] : Findings as above.

## 2022-09-06 ENCOUNTER — APPOINTMENT (OUTPATIENT)
Dept: SURGERY | Facility: CLINIC | Age: 62
End: 2022-09-06

## 2022-09-06 ENCOUNTER — LABORATORY RESULT (OUTPATIENT)
Age: 62
End: 2022-09-06

## 2022-09-06 VITALS
TEMPERATURE: 97.6 F | BODY MASS INDEX: 21.2 KG/M2 | HEART RATE: 108 BPM | OXYGEN SATURATION: 100 % | SYSTOLIC BLOOD PRESSURE: 90 MMHG | DIASTOLIC BLOOD PRESSURE: 64 MMHG | WEIGHT: 160 LBS | HEIGHT: 73 IN

## 2022-09-06 PROCEDURE — 99215 OFFICE O/P EST HI 40 MIN: CPT | Mod: 25

## 2022-09-06 PROCEDURE — 10060 I&D ABSCESS SIMPLE/SINGLE: CPT

## 2022-09-06 RX ORDER — CEPHALEXIN 500 MG/1
500 TABLET ORAL 3 TIMES DAILY
Qty: 21 | Refills: 0 | Status: DISCONTINUED | COMMUNITY
Start: 2022-09-06 | End: 2022-09-06

## 2022-09-06 NOTE — REVIEW OF SYSTEMS
[Negative] : Heme/Lymph [FreeTextEntry7] : Leakage around ostomy, though less.  [de-identified] : Redness around lower abdomen.

## 2022-09-06 NOTE — HISTORY OF PRESENT ILLNESS
[de-identified] : CINTHYA GUZMAN is a 62 year-old man who was recently treated at [facility] from 8/6 - 8/10 for small bowel obstruction secondary to an incarcerated right inguinal hernia. His history is pertinent for a total abdominal colectomy and end ileostomy, DM2, HTN. His right inguinal hernia was reduced and his SBO subsequently resolved. Given significant skin irritation of his RLQ and groin, an inguinal hernia repair was deferred due to concerns for wound infection and poor. Furthermore, he was noted to have significant hypoalbuminemia. He was evaluated by an ostomy nurse while inpatient. \par  [de-identified] : Since the last visit, he has been visited by a wound care nurse one with some adjustment of the ostomy. Leakage has been better controlled, but still some leaking at the skin creases. Has not had recurrence of his right inguinal hernia. However, he has noticed over the past couple (unclear) days of worsening pain and increased swelling in the right buttock area. No fever, chills, or drainage. Other than these no other major concerns at this time.

## 2022-09-06 NOTE — REVIEW OF SYSTEMS
[Negative] : Heme/Lymph [FreeTextEntry7] : Leakage around ostomy, though less.  [de-identified] : Redness around lower abdomen.

## 2022-09-06 NOTE — HISTORY OF PRESENT ILLNESS
[de-identified] : CINTHYA GUZMAN is a 62 year-old man who was recently treated at [facility] from 8/6 - 8/10 for small bowel obstruction secondary to an incarcerated right inguinal hernia. His history is pertinent for a total abdominal colectomy and end ileostomy, DM2, HTN. His right inguinal hernia was reduced and his SBO subsequently resolved. Given significant skin irritation of his RLQ and groin, an inguinal hernia repair was deferred due to concerns for wound infection and poor. Furthermore, he was noted to have significant hypoalbuminemia. He was evaluated by an ostomy nurse while inpatient. \par  [de-identified] : Since the last visit, he has been visited by a wound care nurse one with some adjustment of the ostomy. Leakage has been better controlled, but still some leaking at the skin creases. Has not had recurrence of his right inguinal hernia. However, he has noticed over the past couple (unclear) days of worsening pain and increased swelling in the right buttock area. No fever, chills, or drainage. Other than these no other major concerns at this time.

## 2022-09-06 NOTE — PHYSICAL EXAM
[JVD] : no jugular venous distention  [Normal Breath Sounds] : Normal breath sounds [Respiratory Effort] : normal respiratory effort [Normal Rate and Rhythm] : normal rate and rhythm [Alert] : alert [Oriented to Person] : oriented to person [Oriented to Place] : oriented to place [Oriented to Time] : oriented to time [Calm] : calm [de-identified] : No distress.  [de-identified] : RLQ ostomy with prolapse and viable mucosa. Reducible. Minimal leakage along skin bulge with leakage of contents medially. Woody erythema of skin below the ostomy extending to the groin, although extent is less - now above ASIS. Reducible inguinal hernia.  [de-identified] : No joint swelling.  [de-identified] : Bulge in right buttock area with induration and fluctuance.

## 2022-09-06 NOTE — PROCEDURE
[FreeTextEntry1] : Right buttock abscess. \par \par Area cleansed with betadine. Approximately 5cc of pus aspiration from wound and sent for culture. 6cc of 1% lidocaine infused into surrounding area. 1.5cm incision with #11 blade created and pus in abscess cavity drained. Cavity blunty dissected and appeared to be singular without other areas of loculation. Irrigated with normal saline. 1/4" packing strips packed into wound and gauze dressing applied. Patient tolerated procedure well.

## 2022-09-06 NOTE — PHYSICAL EXAM
[JVD] : no jugular venous distention  [Normal Breath Sounds] : Normal breath sounds [Respiratory Effort] : normal respiratory effort [Normal Rate and Rhythm] : normal rate and rhythm [Alert] : alert [Oriented to Person] : oriented to person [Oriented to Place] : oriented to place [Oriented to Time] : oriented to time [Calm] : calm [de-identified] : No distress.  [de-identified] : RLQ ostomy with prolapse and viable mucosa. Reducible. Minimal leakage along skin bulge with leakage of contents medially. Woody erythema of skin below the ostomy extending to the groin, although extent is less - now above ASIS. Reducible inguinal hernia.  [de-identified] : No joint swelling.  [de-identified] : Bulge in right buttock area with induration and fluctuance.

## 2022-09-06 NOTE — ASSESSMENT
[FreeTextEntry1] : - daily packing changes.\par - Return on 9/9 for wound check. \par - Antibiotics sent to pharmacy.

## 2022-09-09 ENCOUNTER — APPOINTMENT (OUTPATIENT)
Dept: SURGERY | Facility: CLINIC | Age: 62
End: 2022-09-09

## 2022-09-09 DIAGNOSIS — Z83.518 FAMILY HISTORY OF OTHER SPECIFIED EYE DISORDER: ICD-10-CM

## 2022-09-16 ENCOUNTER — APPOINTMENT (OUTPATIENT)
Dept: SURGERY | Facility: CLINIC | Age: 62
End: 2022-09-16

## 2022-09-16 VITALS
OXYGEN SATURATION: 98 % | TEMPERATURE: 97.8 F | WEIGHT: 160 LBS | BODY MASS INDEX: 21.2 KG/M2 | HEART RATE: 103 BPM | DIASTOLIC BLOOD PRESSURE: 68 MMHG | HEIGHT: 73 IN | SYSTOLIC BLOOD PRESSURE: 84 MMHG

## 2022-09-16 DIAGNOSIS — L02.31 CUTANEOUS ABSCESS OF BUTTOCK: ICD-10-CM

## 2022-09-16 DIAGNOSIS — K94.19 OTHER COMPLICATIONS OF ENTEROSTOMY: ICD-10-CM

## 2022-09-16 DIAGNOSIS — L03.116 CELLULITIS OF LEFT LOWER LIMB: ICD-10-CM

## 2022-09-16 DIAGNOSIS — K40.90 UNILATERAL INGUINAL HERNIA, W/OUT OBSTRUCTION OR GANGRENE, NOT SPECIFIED AS RECURRENT: ICD-10-CM

## 2022-09-16 PROCEDURE — 99213 OFFICE O/P EST LOW 20 MIN: CPT | Mod: 24

## 2022-09-16 NOTE — HISTORY OF PRESENT ILLNESS
[de-identified] : CINTHYA GUZMAN is a 62 year-old man who was recently treated at [facility] from 8/6 - 8/10 for small bowel obstruction secondary to an incarcerated right inguinal hernia. His history is pertinent for a total abdominal colectomy and end ileostomy, DM2, HTN. His right inguinal hernia was reduced and his SBO subsequently resolved. Given significant skin irritation of his RLQ and groin, an inguinal hernia repair was deferred due to concerns for wound infection and poor healing. Furthermore, he was noted to have significant hypoalbuminemia. He was evaluated by an ostomy nurse while inpatient. His ostomy appliance seal has been evaluated by an ostomy nurse as an outpatient with improvement. Still having some residual leakage around the medial abdominal wall crease. Erythema on skin improving and regressing somewhat. \par \par On office f/u on 9/6/22, he was noted to have a right gluteal abscess for which a bedside I+D was done. The wound was packed and daily changes were recommended. A week-long course of Keflex was sent to the pharmacy. He was requested to have short-term followup in our office, however, was not present at the appointed time. His living facility was contacted and eventually arrangements were made for followup today. \par  [de-identified] : Since the last office visit, he had a followup with his colorectal surgeon who had requested Urology and GI f/u prior to consideration of ileostomy reversal, repair, or relocation. His right gluteal incision has reportedly packed daily with iodoform strips. No significant drainage from the incision and induration/erythema improved somewhat. His ostomy appliance is still leaking around the medial portion. Able to eat well without nausea or vomiting. Did have a recurrent inguinal bulge about two days ago, but has spontaneously reduced. Having good bowel function. He reports a new left thigh lesion which has been aspirated by a visiting wound care provider two days prior. No incision was created at that time.

## 2022-09-16 NOTE — REVIEW OF SYSTEMS
[Negative] : Heme/Lymph [FreeTextEntry8] : Occasional right inguinal pain.  [FreeTextEntry9] : Left thigh lesion.

## 2022-09-16 NOTE — DATA REVIEWED
[FreeTextEntry1] : Labs from outpatient facility 9/7/22 reviewed:\par \par ALB 2.5\par Prealbumin 29

## 2022-09-16 NOTE — PHYSICAL EXAM
[JVD] : no jugular venous distention  [de-identified] : No distress.  [de-identified] : RLQ ostomy with prolapse and viable mucosa. Reducible. Minimal leakage along skin bulge with leakage of contents medially. Woody erythema of skin below the ostomy extending to the to about the level of ASIS. Reducible inguinal hernia.  [de-identified] : No joint swelling.  [de-identified] : Bulge in right buttock area with 1cm incision. No active drainage. Residual induration. Zone of erythema and induration in left thigh. Needle-aspirated without any fluid.

## 2022-12-09 NOTE — PATIENT PROFILE ADULT - FUNCTIONAL ASSESSMENT - BASIC MOBILITY ASSESSMENT TYPE
Post Acute Skilled Nursing Home Visit Note     Date of Service: 12/9/2022  Location seen at: [facility] SKILLED NURSING  Subacute / Skilled Need: IV ABX    PCP: Christiana Wood MD   Patient Care Team:  Christiana Wood MD as PCP - General (Internal Medicine)  Lisa Ferrer, ADINA as Care Transitions Nurse (Registered Nurse)  Jaylene Trivedi CNP as Post Acute Facility Provider: APC (Nurse Practitioner - Family)  Deshaun Brock MD as Post Acute Facility Provider: Physician (Geriatric Medicine)  [facility] 10/2-10/14/22    History of Present Illness: 75 year old female with a past medical history of's tobacco use, recent complaint of palpitations, presenting to the emergency room on 10/2/22 after being found down for 4 days. Work up included  CT head negative for acute abnormality but does have chronic left MCA infarct. Trauma work up negative.  CTA of the chest no pulmonary emboli. Patient started on IV fluids and Zosyn in the ED. She was found to have a acute ischemic left MCA on MRI.  Patient was given aspirin and statin ans was seen by neurology. Completed Keppra prophylaxis. Started on a 30 day event monitor as was being worked up for palpitations. Was also found to have aspiration pneumonia and treated with Cefepime and Augmentin. PEG tube placed and feedings started. Developed pressure sores to sacrum and hip from being down for 4 days. Medically optmized and transferred to [facility] on Friday, 10/14/22. Seen by SNF MD on 10/17/22.     Was skilled until for several weeks as unable to progress due to CVA and global aphasia; handed off to house NP who followed; per her records her stage 4 pressure ulcer, which was present on admission, has become infected with acute osteomyelitis. Was being followed by in house ID; late October had wbc of 20 with urine 50K VRE, CXR unremarkable; infection thought to be from sacral wound and started on IV Vanco and Cefepime; culture obtained after ABX started  and showed no growth; completed the ABX on 10/30; sacral xray indeterminate for Acute OM; WBC became elevated again to 18.2 on 11/9 and culture grew Acinobater MDRO and proteus; was started on Unasyn; WBC trended down; bone biopsy taken and resulted on 11/25 with acute OM; ID started IV Vanco through 1/10/23 and Zosyn through 12/26/22. Has been reskilled under the medicare waiver.     Seen and examined today in her room; resting in bed; VSS; alert, aphasic; global aphasia; PEG tube feeds in place; midline to LUE; mitchell cath in place; functionally incontinent of stool; 02 3L per nc; tolerating IV abx thus far without adverse effects    HISTORY  Past Medical History:   Diagnosis Date   • Acute osteomyelitis of sacrum (CMS/HCC)    • No known problems    • Pressure ulcer, sacrum    • Stroke (CMS/HCC) 10/02/2022    acute ischemic left MCA      reports that she has been smoking. She has been smoking about 0.25 packs per day. She has never used smokeless tobacco. She reports that she does not drink alcohol and does not use drugs.  Past Surgical History:   Procedure Laterality Date   • Removal of tonsils,12+ y/o     • Tube insertion       Family History   Problem Relation Age of Onset   • Cancer, Pancreatic Mother    • Heart disease Father      History     Not marked as reviewed during this visit.          PROBLEM LIST:  Patient Active Problem List   Diagnosis   • Sepsis, due to unspecified organism, unspecified whether acute organ dysfunction present (CMS/[facility])   • Elevated CK   • SIRS (systemic inflammatory response syndrome) (CMS/HCC)   • Encephalopathy   • Lactic acidosis   • Elevated troponin   • Stroke (CMS/HCC)   • Heart palpitations   • Tobacco dependence   • Hypoxia   • Bandemia   • Hemiparesis affecting right side as late effect of cerebrovascular accident (CMS/HCC)   • Dysphagia as late effect of stroke   • Aphasia as late effect of cerebrovascular accident   • Debility   • Acute osteomyelitis of sacrum (CMS/HCC)    • Pressure ulcer, sacrum     Advance Care Planning    Location: [facility] SKILLED NURSING  Participants: Jaylene Trivedi, SYLVAIN,   ayleen unable to participate in GOC/ACP conversation  Advance Directives Documented yes; signed DNR from the hospital  Code status: Do Not Resuscitate   Does patient have a state code status order form ([place]-POLST [place]) completed?: Yes       DEPRESSION SCREENING:  Recent PHQ 2/9 Score    PHQ 2:  Date Able to Complete Unable to Complete Reason Adult PHQ 2 Score Adult PHQ 2 Interpretation   12/2/2022 No - - -       PHQ 9:  Date Able to Complete Unable to Complete Reason   12/2/2022 No -       DEPRESSION ASSESSMENT/PLAN:  unable to screen properly due to aphasia    ALLERGIES:  Allergies as of 12/09/2022   • (No Known Allergies)     CURRENT MEDICATIONS:   Current Outpatient Medications   Medication Sig Dispense Refill   • vancomycin (VANCOCIN) 500 MG injectable solution Inject 750 mg into the vein every 12 hours.     • Piperacillin Sod-Tazobactam So 3-0.375 g Recon Soln Inject 3.375 g into the vein every 8 hours.     • traMADol (ULTRAM) 50 MG tablet 50 mg by PEG Tube route every 8 hours as needed.     • acetaminophen (TYLENOL) 160 MG/5ML solution 20.3 mLs by PEG Tube route every 8 hours.     • collagenase (SANTYL) 250 UNIT/GM ointment Apply topically daily. To pressure ulcers     • sennosides (SENOKOT) 8.8 MG/5ML syrup 5 mLs by PEG Tube route in the morning and 5 mLs in the evening.     • atorvastatin (LIPITOR) 80 MG tablet Take 1 tablet by mouth nightly. Give through peg tube     • aspirin 81 MG chewable tablet 1 tablet by Per G Tube route daily. Do not start before October 15, 2022.     • amLODIPine (NORVASC) 5 MG tablet 1 tablet by Per G Tube route daily. Do not start before October 15, 2022.       No current facility-administered medications for this visit.     Medications reviewed / reconciled: Yes    BASELINE FUNCTIONAL STATUS:  Independent    CURRENT FUNCTIONAL  STATUS:  see debility in a/p    DIET:  Consistency: Pureed and Enteral  Type: Jevity 1.5 55ml/hr x 21 hours daily with water flush 180cc q 4  Appetite: Poor    REVIEW OF SYSTEMS:  Review of Systems   Unable to perform ROS: Patient nonverbal   see HPI    VITALS:  Visit Vitals  /65   Pulse 74   Temp 97.5 °F (36.4 °C)   Resp 18   SpO2 96%     PAIN SCORE:  Unable to quantify    PHYSICAL ASSESSMENT:  Physical Exam  Constitutional:       General: She is not in acute distress.     Appearance: She is well-developed. She is not ill-appearing.      Comments: Dozes off   HENT:      Head: Normocephalic and atraumatic.      Nose: Nose normal. No congestion or rhinorrhea.      Mouth/Throat:      Mouth: Mucous membranes are dry.      Comments: Able to easily as she was screaming  Eyes:      General: No scleral icterus.        Right eye: No discharge.         Left eye: No discharge.      Extraocular Movements: Extraocular movements intact.      Conjunctiva/sclera: Conjunctivae normal.   Cardiovascular:      Rate and Rhythm: Normal rate and regular rhythm.      Heart sounds: Normal heart sounds. No murmur heard.  Pulmonary:      Effort: Pulmonary effort is normal. No respiratory distress.      Breath sounds: Normal breath sounds. No wheezing or rales.      Comments: From what I was able to appreciate at the time  Abdominal:      General: Abdomen is flat. Bowel sounds are normal. There is no distension.      Palpations: Abdomen is soft.      Tenderness: There is no abdominal tenderness.      Comments: PEG tube in place; no s/s infection;    Genitourinary:     Comments: Kline draining hazy medium yellow urine  Musculoskeletal:         General: Normal range of motion.      Right lower leg: No edema.      Left lower leg: No edema.      Comments: Dependent swelling to RUE improved with tubigrip; right knee with what appear to be arthritic changes; keeps in bent; able to passively extend her    Skin:     General: Skin is warm and  Admission dry.      Capillary Refill: Capillary refill takes less than 2 seconds.      Findings: No rash.      Comments: Sacral wound stage 4 measuring 4.5cm x 3.2cm approx 1.3cm deep with 1.8cm undermining 5-7 o'clock;    Right hip PU 1cm x 1cm with shallow depth with 100% adherent tan slough; no s/s infection   Neurological:      Mental Status: She is alert. She is disoriented.      Cranial Nerves: No cranial nerve deficit.      Coordination: Coordination normal.      Comments: Unable to assess; nonverbal; dense right hemiplegia   Psychiatric:         Mood and Affect: Mood normal.         Behavior: Behavior normal.      Comments: Unable to speak, cooperative     LABS:  11/15: wbc 11.5  11/21: wbc 11.1  11/28: Na 143 K 3.4 bun 24 creat 0.5 alb 3.1 lft wnl wbc 17.5 hgb 13.2 plt 323 CRP 12.57  ESR 65  11/29: Na 147 K 4.2 bun 48 creat 1.2 ca 10.3 wbc 5.3 hgb 13.3 plt 256 crp 90.65 ESR 75  12/5/22: Na 135 K 3.8 bun 17 creat 0.5 alb 2.6 lft wnl mag 1.93 pot 2.8 wbc 5.2 hgb 12 plt 212 crp 11.55 esr pending    ASSESSMENT AND PLAN  Stage 4 pressure ulcer with acute osteomyelitis  Leukocytosis  In house ID following  See HPI  Trend labs  IV Vanco and zosyn  Weekly cbc, cmp and CRP  Local wound care  Optimize nutrition  Turn and reposition    Acute ischemic L MCA cva with right hemiplegia; dysphagia with PEG feeds/ dysphasia  Asa, statin  puree/dysphagia 1 and honey-thick liquids (via teaspoon amounts only. No cups or straws)  Peg feeds with jevity 1.5 55ml/hr x 21 hours with 180ml water fllush q 5    Acute hypoxic resp failure 2/2Sepsis, possibly r/t pulmonary source aspiration   RESOLVED    HTN  Continue amlodipine 5mg daily  Monitor VS    Palpitations prior to CVA  Had just started work up  30 day even monitor in place  Should be removed in a week  Follow up with cardiology unable to be done as she is bedbound; will alert cardiology when event monitor is removed    Right knee swelling; looks like OA changes  Concerned that will get  contracted  Will reach out to rest[facility]ative   Need PROM     FOLLOW UP APPOINTMENTS:  SNF MD/APN    DISCHARGE PLANNING: [facility]    Prognosis: fair    Discussed with: nursing, IDT, ID APN    Total time spent is more than 25 minutes, with more than 50% of the time spent in coordination of care, counseling, review of records and discussion of plan of care with the patient /staff /family.    Disclaimer: Note to patient - The 21st Century Cures Act requires that medical notes like this be available to patients in the interest of transparency. However, be advised this is a medical document. It is intended as peer to peer communication. It is written in medical language and may contain abbreviations or verbiage that are unfamiliar. It may appear blunt or direct. Medical documents are intended to carry relevant information, facts as evident, and the clinical opinion of the practitioner.

## 2023-12-05 NOTE — DISCHARGE NOTE PROVIDER - DISCHARGE SERVICE FOR PATIENT
on the discharge service for the patient. I have reviewed and made amendments to the documentation where necessary. Pt from NH, noted on LPS, MVI, Zn supplement. Wt per transfer record 80.4#.

## 2024-01-09 NOTE — ED ADULT NURSE NOTE - OBJECTIVE STATEMENT
Automatic Reconciliation, Ar   cyclobenzaprine (FLEXERIL) 10 MG tablet TAKE 1 TABLET BY MOUTH THREE TIMES DAILY AS NEEDED FOR MUSCLE SPASM 12/20/18   Automatic Reconciliation, Ar   fluticasone-salmeterol (ADVAIR DISKUS) 250-50 MCG/ACT AEPB diskus inhaler Inhale 1 puff into the lungs 2 times daily 12/7/18   Automatic Reconciliation, Ar   hydroCHLOROthiazide (HYDRODIURIL) 25 MG tablet TAKE 1 TABLET BY MOUTH ONCE DAILY FOR BLOOD PRESSURE/SWELLING 3/11/19   Automatic Reconciliation, Ar   lisinopril (PRINIVIL;ZESTRIL) 5 MG tablet Take 1 tablet by mouth daily 8/30/18   Automatic Reconciliation, Ar   mometasone (NASONEX) 50 MCG/ACT nasal spray 2 sprays by Nasal route daily 8/30/18   Automatic Reconciliation, Ar   montelukast (SINGULAIR) 10 MG tablet take 1 tablet by mouth once daily 8/30/18   Automatic Reconciliation, Ar   rosuvastatin (CRESTOR) 10 MG tablet Take 1 tablet by mouth daily    Automatic Reconciliation, Ar   tiotropium (SPIRIVA) 18 MCG inhalation capsule Inhale 1 capsule into the lungs daily    Automatic Reconciliation, Ar       Physical Exam:     General: well developed, well nourished   HEENT: unremarkable   Heart: regular rhythm no mumur    Lungs: clear   Abdominal:  benign   Neurological: unremarkable   Extremities: no edema     Findings/Diagnosis: Anemia, iron deficient    Plan of Care/Planned Procedure: EGD and colonoscopy with  monitored anesthesia care sedation       Signed:  GÉNESIS Cheatham MD 1/9/2024      
pt axo3 coming from [facility] c/o abdominal pain by ileostomy and n/v. pt states ileostomy was placed lact october or november. pt states there is decreased output into ileostomy since yesterday and that he is not able to keep food or fluids down w/o vomiting. pt denies chest pain or SOB. safety maintained.

## 2025-01-19 NOTE — ED PROVIDER NOTE - CROS ED [facility] ALL NEG
Pharmacy Consult to evaluate for medication related stroke core measures    Pérez Dumont, 56 year old male admitted for ischemic stroke on 1/18/2025.    Thrombolytic was not given because of Time from onset contraindications    VTE Prophylaxis SCDs /PCDs placed on 1/18/25, as appropriate prior to end of hospital day 2.    Antithrombotic: aspirin and clopidogrel started on 1/18/25, as appropriate by end of hospital day 2. Continue antithrombotic therapy on discharge to meet quality measures, unless contraindicated.    Anticoagulation if history of A-fib/flutter: Patient does not have history of A-fib/flutter - anticoagulation not required for medication related stroke core measures.     LDL Cholesterol Calculated   Date Value Ref Range Status   01/18/2025 295 (H) <100 mg/dL Final       Patient currently receiving Lipitor (atorvastatin) continue statin on discharge to meet quality measures, unless contraindicated.    Recommendations: None at this time    Thank you for the consult.    Alayna Yusuf [facility] 1/18/2025 9:26 PM      negative...
